# Patient Record
Sex: FEMALE | Race: BLACK OR AFRICAN AMERICAN | NOT HISPANIC OR LATINO | URBAN - METROPOLITAN AREA
[De-identification: names, ages, dates, MRNs, and addresses within clinical notes are randomized per-mention and may not be internally consistent; named-entity substitution may affect disease eponyms.]

---

## 2017-03-12 ENCOUNTER — EMERGENCY (EMERGENCY)
Facility: HOSPITAL | Age: 72
LOS: 1 days | Discharge: HOME | End: 2017-03-12
Attending: EMERGENCY MEDICINE | Admitting: EMERGENCY MEDICINE
Payer: COMMERCIAL

## 2017-03-12 VITALS
HEIGHT: 65 IN | HEART RATE: 78 BPM | OXYGEN SATURATION: 99 % | TEMPERATURE: 98 F | RESPIRATION RATE: 18 BRPM | WEIGHT: 167.99 LBS

## 2017-03-12 VITALS — RESPIRATION RATE: 16 BRPM | OXYGEN SATURATION: 100 % | HEART RATE: 90 BPM | TEMPERATURE: 98 F

## 2017-03-12 DIAGNOSIS — Z79.01 LONG TERM (CURRENT) USE OF ANTICOAGULANTS: ICD-10-CM

## 2017-03-12 DIAGNOSIS — K21.9 GASTRO-ESOPHAGEAL REFLUX DISEASE WITHOUT ESOPHAGITIS: ICD-10-CM

## 2017-03-12 DIAGNOSIS — Z87.891 PERSONAL HISTORY OF NICOTINE DEPENDENCE: ICD-10-CM

## 2017-03-12 DIAGNOSIS — I25.10 ATHEROSCLEROTIC HEART DISEASE OF NATIVE CORONARY ARTERY WITHOUT ANGINA PECTORIS: ICD-10-CM

## 2017-03-12 DIAGNOSIS — K57.32 DIVERTICULITIS OF LARGE INTESTINE WITHOUT PERFORATION OR ABSCESS WITHOUT BLEEDING: ICD-10-CM

## 2017-03-12 DIAGNOSIS — R10.32 LEFT LOWER QUADRANT PAIN: ICD-10-CM

## 2017-03-12 DIAGNOSIS — I25.2 OLD MYOCARDIAL INFARCTION: ICD-10-CM

## 2017-03-12 DIAGNOSIS — I11.0 HYPERTENSIVE HEART DISEASE WITH HEART FAILURE: ICD-10-CM

## 2017-03-12 DIAGNOSIS — I50.9 HEART FAILURE, UNSPECIFIED: ICD-10-CM

## 2017-03-12 LAB
ALBUMIN SERPL ELPH-MCNC: 4.1 G/DL — SIGNIFICANT CHANGE UP (ref 3.3–5)
ALP SERPL-CCNC: 80 U/L — SIGNIFICANT CHANGE UP (ref 40–120)
ALT FLD-CCNC: 15 U/L RC — SIGNIFICANT CHANGE UP (ref 10–45)
ANION GAP SERPL CALC-SCNC: 19 MMOL/L — HIGH (ref 5–17)
APPEARANCE UR: CLEAR — SIGNIFICANT CHANGE UP
APTT BLD: 41.1 SEC — HIGH (ref 27.5–37.4)
AST SERPL-CCNC: 21 U/L — SIGNIFICANT CHANGE UP (ref 10–40)
BASE EXCESS BLDV CALC-SCNC: 3.4 MMOL/L — HIGH (ref -2–2)
BASE EXCESS BLDV CALC-SCNC: 4.4 MMOL/L — HIGH (ref -2–2)
BASOPHILS # BLD AUTO: 0 K/UL — SIGNIFICANT CHANGE UP (ref 0–0.2)
BASOPHILS NFR BLD AUTO: 0.5 % — SIGNIFICANT CHANGE UP (ref 0–2)
BILIRUB SERPL-MCNC: 0.6 MG/DL — SIGNIFICANT CHANGE UP (ref 0.2–1.2)
BILIRUB UR-MCNC: NEGATIVE — SIGNIFICANT CHANGE UP
BUN SERPL-MCNC: 14 MG/DL — SIGNIFICANT CHANGE UP (ref 7–23)
CA-I SERPL-SCNC: 1.21 MMOL/L — SIGNIFICANT CHANGE UP (ref 1.12–1.3)
CA-I SERPL-SCNC: 1.26 MMOL/L — SIGNIFICANT CHANGE UP (ref 1.12–1.3)
CALCIUM SERPL-MCNC: 9.9 MG/DL — SIGNIFICANT CHANGE UP (ref 8.4–10.5)
CHLORIDE BLDV-SCNC: 100 MMOL/L — SIGNIFICANT CHANGE UP (ref 96–108)
CHLORIDE BLDV-SCNC: 99 MMOL/L — SIGNIFICANT CHANGE UP (ref 96–108)
CHLORIDE SERPL-SCNC: 98 MMOL/L — SIGNIFICANT CHANGE UP (ref 96–108)
CO2 BLDV-SCNC: 30 MMOL/L — SIGNIFICANT CHANGE UP (ref 22–30)
CO2 BLDV-SCNC: 34 MMOL/L — HIGH (ref 22–30)
CO2 SERPL-SCNC: 26 MMOL/L — SIGNIFICANT CHANGE UP (ref 22–31)
COLOR SPEC: SIGNIFICANT CHANGE UP
CREAT SERPL-MCNC: 0.95 MG/DL — SIGNIFICANT CHANGE UP (ref 0.5–1.3)
DIFF PNL FLD: NEGATIVE — SIGNIFICANT CHANGE UP
EOSINOPHIL # BLD AUTO: 0.1 K/UL — SIGNIFICANT CHANGE UP (ref 0–0.5)
EOSINOPHIL NFR BLD AUTO: 0.5 % — SIGNIFICANT CHANGE UP (ref 0–6)
GAS PNL BLDV: 138 MMOL/L — SIGNIFICANT CHANGE UP (ref 136–145)
GAS PNL BLDV: 141 MMOL/L — SIGNIFICANT CHANGE UP (ref 136–145)
GAS PNL BLDV: SIGNIFICANT CHANGE UP
GLUCOSE BLDV-MCNC: 107 MG/DL — HIGH (ref 70–99)
GLUCOSE BLDV-MCNC: 99 MG/DL — SIGNIFICANT CHANGE UP (ref 70–99)
GLUCOSE SERPL-MCNC: 106 MG/DL — HIGH (ref 70–99)
GLUCOSE UR QL: NEGATIVE — SIGNIFICANT CHANGE UP
HCO3 BLDV-SCNC: 29 MMOL/L — SIGNIFICANT CHANGE UP (ref 21–29)
HCO3 BLDV-SCNC: 32 MMOL/L — HIGH (ref 21–29)
HCT VFR BLD CALC: 39.8 % — SIGNIFICANT CHANGE UP (ref 34.5–45)
HCT VFR BLDA CALC: 38 % — LOW (ref 39–50)
HCT VFR BLDA CALC: 39 % — SIGNIFICANT CHANGE UP (ref 39–50)
HGB BLD CALC-MCNC: 12.3 G/DL — SIGNIFICANT CHANGE UP (ref 11.5–15.5)
HGB BLD CALC-MCNC: 12.8 G/DL — SIGNIFICANT CHANGE UP (ref 11.5–15.5)
HGB BLD-MCNC: 12.7 G/DL — SIGNIFICANT CHANGE UP (ref 11.5–15.5)
HOROWITZ INDEX BLDV+IHG-RTO: SIGNIFICANT CHANGE UP
HOROWITZ INDEX BLDV+IHG-RTO: SIGNIFICANT CHANGE UP
INR BLD: 2.31 RATIO — HIGH (ref 0.88–1.16)
KETONES UR-MCNC: NEGATIVE — SIGNIFICANT CHANGE UP
LACTATE BLDV-MCNC: 1.2 MMOL/L — SIGNIFICANT CHANGE UP (ref 0.7–2)
LACTATE BLDV-MCNC: 3.3 MMOL/L — HIGH (ref 0.7–2)
LEUKOCYTE ESTERASE UR-ACNC: NEGATIVE — SIGNIFICANT CHANGE UP
LIDOCAIN IGE QN: 26 U/L — SIGNIFICANT CHANGE UP (ref 7–60)
LYMPHOCYTES # BLD AUTO: 2.4 K/UL — SIGNIFICANT CHANGE UP (ref 1–3.3)
LYMPHOCYTES # BLD AUTO: 24.5 % — SIGNIFICANT CHANGE UP (ref 13–44)
MCHC RBC-ENTMCNC: 30 PG — SIGNIFICANT CHANGE UP (ref 27–34)
MCHC RBC-ENTMCNC: 31.9 GM/DL — LOW (ref 32–36)
MCV RBC AUTO: 93.8 FL — SIGNIFICANT CHANGE UP (ref 80–100)
MONOCYTES # BLD AUTO: 1.5 K/UL — HIGH (ref 0–0.9)
MONOCYTES NFR BLD AUTO: 14.9 % — HIGH (ref 2–14)
NEUTROPHILS # BLD AUTO: 5.9 K/UL — SIGNIFICANT CHANGE UP (ref 1.8–7.4)
NEUTROPHILS NFR BLD AUTO: 59.6 % — SIGNIFICANT CHANGE UP (ref 43–77)
NITRITE UR-MCNC: NEGATIVE — SIGNIFICANT CHANGE UP
PCO2 BLDV: 50 MMHG — SIGNIFICANT CHANGE UP (ref 35–50)
PCO2 BLDV: 68 MMHG — HIGH (ref 35–50)
PH BLDV: 7.3 — LOW (ref 7.35–7.45)
PH BLDV: 7.38 — SIGNIFICANT CHANGE UP (ref 7.35–7.45)
PH UR: 7 — SIGNIFICANT CHANGE UP (ref 4.8–8)
PLATELET # BLD AUTO: 205 K/UL — SIGNIFICANT CHANGE UP (ref 150–400)
PO2 BLDV: 20 MMHG — LOW (ref 25–45)
PO2 BLDV: 33 MMHG — SIGNIFICANT CHANGE UP (ref 25–45)
POTASSIUM BLDV-SCNC: 3.6 MMOL/L — SIGNIFICANT CHANGE UP (ref 3.5–5)
POTASSIUM BLDV-SCNC: 4 MMOL/L — SIGNIFICANT CHANGE UP (ref 3.5–5)
POTASSIUM SERPL-MCNC: 4.9 MMOL/L — SIGNIFICANT CHANGE UP (ref 3.5–5.3)
POTASSIUM SERPL-SCNC: 4.9 MMOL/L — SIGNIFICANT CHANGE UP (ref 3.5–5.3)
PROT SERPL-MCNC: 7.7 G/DL — SIGNIFICANT CHANGE UP (ref 6–8.3)
PROT UR-MCNC: NEGATIVE — SIGNIFICANT CHANGE UP
PROTHROM AB SERPL-ACNC: 25.4 SEC — HIGH (ref 10–13.1)
RBC # BLD: 4.24 M/UL — SIGNIFICANT CHANGE UP (ref 3.8–5.2)
RBC # FLD: 12.6 % — SIGNIFICANT CHANGE UP (ref 10.3–14.5)
RBC CASTS # UR COMP ASSIST: SIGNIFICANT CHANGE UP /HPF (ref 0–2)
SAO2 % BLDV: 24 % — LOW (ref 67–88)
SAO2 % BLDV: 48 % — LOW (ref 67–88)
SODIUM SERPL-SCNC: 143 MMOL/L — SIGNIFICANT CHANGE UP (ref 135–145)
SP GR SPEC: 1.01 — LOW (ref 1.01–1.02)
UROBILINOGEN FLD QL: NEGATIVE — SIGNIFICANT CHANGE UP
WBC # BLD: 9.9 K/UL — SIGNIFICANT CHANGE UP (ref 3.8–10.5)
WBC # FLD AUTO: 9.9 K/UL — SIGNIFICANT CHANGE UP (ref 3.8–10.5)
WBC UR QL: SIGNIFICANT CHANGE UP /HPF (ref 0–5)

## 2017-03-12 PROCEDURE — 87086 URINE CULTURE/COLONY COUNT: CPT

## 2017-03-12 PROCEDURE — 85730 THROMBOPLASTIN TIME PARTIAL: CPT

## 2017-03-12 PROCEDURE — 93010 ELECTROCARDIOGRAM REPORT: CPT

## 2017-03-12 PROCEDURE — 85027 COMPLETE CBC AUTOMATED: CPT

## 2017-03-12 PROCEDURE — 84132 ASSAY OF SERUM POTASSIUM: CPT

## 2017-03-12 PROCEDURE — 83615 LACTATE (LD) (LDH) ENZYME: CPT

## 2017-03-12 PROCEDURE — 81001 URINALYSIS AUTO W/SCOPE: CPT

## 2017-03-12 PROCEDURE — 99285 EMERGENCY DEPT VISIT HI MDM: CPT | Mod: 25

## 2017-03-12 PROCEDURE — 83690 ASSAY OF LIPASE: CPT

## 2017-03-12 PROCEDURE — 74177 CT ABD & PELVIS W/CONTRAST: CPT

## 2017-03-12 PROCEDURE — 83605 ASSAY OF LACTIC ACID: CPT

## 2017-03-12 PROCEDURE — 85610 PROTHROMBIN TIME: CPT

## 2017-03-12 PROCEDURE — 74177 CT ABD & PELVIS W/CONTRAST: CPT | Mod: 26

## 2017-03-12 PROCEDURE — 84295 ASSAY OF SERUM SODIUM: CPT

## 2017-03-12 PROCEDURE — 99284 EMERGENCY DEPT VISIT MOD MDM: CPT | Mod: 25

## 2017-03-12 PROCEDURE — 82330 ASSAY OF CALCIUM: CPT

## 2017-03-12 PROCEDURE — 85014 HEMATOCRIT: CPT

## 2017-03-12 PROCEDURE — 93005 ELECTROCARDIOGRAM TRACING: CPT

## 2017-03-12 PROCEDURE — 82435 ASSAY OF BLOOD CHLORIDE: CPT

## 2017-03-12 PROCEDURE — 82947 ASSAY GLUCOSE BLOOD QUANT: CPT

## 2017-03-12 PROCEDURE — 82803 BLOOD GASES ANY COMBINATION: CPT

## 2017-03-12 PROCEDURE — 80053 COMPREHEN METABOLIC PANEL: CPT

## 2017-03-12 RX ORDER — MOXIFLOXACIN HYDROCHLORIDE TABLETS, 400 MG 400 MG/1
1 TABLET, FILM COATED ORAL
Qty: 14 | Refills: 0 | OUTPATIENT
Start: 2017-03-12 | End: 2017-03-19

## 2017-03-12 RX ORDER — SODIUM CHLORIDE 9 MG/ML
1000 INJECTION INTRAMUSCULAR; INTRAVENOUS; SUBCUTANEOUS ONCE
Qty: 0 | Refills: 0 | Status: COMPLETED | OUTPATIENT
Start: 2017-03-12 | End: 2017-03-12

## 2017-03-12 RX ORDER — TAMSULOSIN HYDROCHLORIDE 0.4 MG/1
1 CAPSULE ORAL
Qty: 14 | Refills: 0 | OUTPATIENT
Start: 2017-03-12 | End: 2017-03-26

## 2017-03-12 RX ORDER — CIPROFLOXACIN LACTATE 400MG/40ML
500 VIAL (ML) INTRAVENOUS ONCE
Qty: 0 | Refills: 0 | Status: COMPLETED | OUTPATIENT
Start: 2017-03-12 | End: 2017-03-12

## 2017-03-12 RX ORDER — METRONIDAZOLE 500 MG
1 TABLET ORAL
Qty: 21 | Refills: 0 | OUTPATIENT
Start: 2017-03-12 | End: 2017-03-19

## 2017-03-12 RX ORDER — METRONIDAZOLE 500 MG
500 TABLET ORAL ONCE
Qty: 0 | Refills: 0 | Status: COMPLETED | OUTPATIENT
Start: 2017-03-12 | End: 2017-03-12

## 2017-03-12 RX ADMIN — Medication 500 MILLIGRAM(S): at 21:54

## 2017-03-12 RX ADMIN — Medication 500 MILLIGRAM(S): at 21:53

## 2017-03-12 RX ADMIN — SODIUM CHLORIDE 1000 MILLILITER(S): 9 INJECTION INTRAMUSCULAR; INTRAVENOUS; SUBCUTANEOUS at 18:28

## 2017-03-12 NOTE — ED ADULT TRIAGE NOTE - ARRIVAL INFO ADDITIONAL COMMENTS
pt had LVAD placed 2 years ago at [facility].  Pt c/o site of insertion pain. pt had LVAD placed 2 years ago at [facility].  Pt c/o site of insertion pain.  LVAD  Nelsy Davidson called and told pt is in ED

## 2017-03-12 NOTE — ED PROVIDER NOTE - PHYSICAL EXAMINATION
PE: CONSTITUTIONAL: Well appearing, well nourished, in no apparent distress. ENMT: Airway patent, nasal mucosa clear, mouth with normal mucosa. HEAD: NCAT EYES: PERRL, EOMI CARDIAC: pulseless, constant hum, +1 pedal edema RESPIRATORY: CTA b/l, no adventitious sounds GI: Abdomen non-distended, LLQ moderately TTP, no rebound or guarding MSK: Spine appears normal, range of motion is not limited, no muscle/joint tenderness NEURO: CNII-XII grossly intact, 5/5 strength, full sensation all extremities, gait intact SKIN: No rash

## 2017-03-12 NOTE — ED ADULT NURSE NOTE - CHPI ED SYMPTOMS NEG
no blood in stool/no nausea/no hematuria/no diarrhea/no burning urination/no vomiting/no fever/no chills/no dysuria

## 2017-03-12 NOTE — ED PROVIDER NOTE - PLAN OF CARE
1. Take the prescribed antibiotics as directed for the full course. Ciprofloxacin 500mg every 12 hours for 7 days, and Flagyl 500mg every 8 hours for 7 days. Continue all other home medications.   2. Follow up with your Primary Care Physician as soon as possible for further evaluation.   3. Return to the Emergency Department for any concerning symptoms.

## 2017-03-12 NOTE — ED PROVIDER NOTE - PROGRESS NOTE DETAILS
LVAD coordinator notified, will f/up Recived sign out Dr Lester 71y female [facility] coronary artery disease., sp mi AND lvad followed at [facility], chf, gerd,fibromyalgia pw ca abd pain llq, Ct fluid bolus and repeat lactate pending. PE adult female awake alert chest clear abd min llq ttp no rebound guarding. +bs neuro no focal defects  Joaquin Green MD, Facep b LVAD contacted for battery  Joaquin Green MD, Facep Spoke with LVAD coordinator, she reviewed labs but requests an LDH be sent off and requests a page before any potential discharge of the patient, [phone]. Gordon Castellanos PA-C . Spoke with LVAD coordinator, she reviewed labs but requests an LDH be sent off and requests a page before any potential discharge of the patient, [phone]. LVAD was interrogated with no alarms or issues. Gordon Castellanos PA-C . Had prolonged dw pt concerning dx of diverticulitis, Pt advised admit/transfer given complexity of her conditon. Pt competent to refuse. Understands risks inculding worsening infection. Prefers to dc home.  Joaquin Green MD, Facep

## 2017-03-12 NOTE — ED PROVIDER NOTE - CARE PLAN
Principal Discharge DX:	Diverticulosis of large intestine without hemorrhage Principal Discharge DX:	Diverticulosis of large intestine without hemorrhage  Instructions for follow-up, activity and diet:	1. Take the prescribed antibiotics as directed for the full course. Ciprofloxacin 500mg every 12 hours for 7 days, and Flagyl 500mg every 8 hours for 7 days. Continue all other home medications.   2. Follow up with your Primary Care Physician as soon as possible for further evaluation.   3. Return to the Emergency Department for any concerning symptoms.

## 2017-03-12 NOTE — ED ADULT NURSE NOTE - OBJECTIVE STATEMENT
71 yr old female with a h/o LVAD present to the ER for pain to the lower abd region. Pt reports that she thinks she has a UTI , however denies urinary discomfort. Pt reports a pain level of 3/10 on pain scale and reports that pain gets worse intermittently. Pt states pain is on and off. Pt denies nausea, vomiting/ diarrhea/ constipation.

## 2017-03-12 NOTE — ED PROVIDER NOTE - ATTENDING CONTRIBUTION TO CARE
71f pmhx CAD s/p MI, CHF s/p LVAD ([facility] last year), HTN, GERD, fibromyalgia p/w abdominal pain. gradual onset, lower abdomen, LLQ worse than RLQ, constant, non-radiating, moderate. denies fever/chills, nausea/vomiting, constipation/diarrhea, dysuria/hematuria. still passing gas. on PE, pulseless 2/2 LVAD, no distress, abdomen non-distended, LLQ moderately TTP, no rebound or guarding. will CT a/p, basics, lipase, VBG, contact LVAD coordinator

## 2017-03-12 NOTE — ED PROVIDER NOTE - NS ED ROS FT
ROS: GENERAL: no fevers, no chills HEENT: no epistaxis, no eye pain, no ear, no throat pain CARDIAC: no chest pain, no shortness of breath PULM: no cough, no shortness of breath GI: no nausea, no vomiting, no diarrhea,  +abdominal pain, no hematemesis, no bright red blood per rectum : no dysuria, no hematuria EXTREMITIES: no arm pain, no leg pain, no back pain SKIN: no purpura, no petechiae NEURO: no headache, no neck pain, no loss of strength/sensation HEME: no easy bruising, no easy bleeding

## 2017-03-12 NOTE — ED PROVIDER NOTE - MEDICAL DECISION MAKING DETAILS
71f pmhx CAD s/p MI, CHF s/p LVAD ([facility] last year), HTN, GERD, fibromyalgia p/w abdominal pain. gradual onset, lower abdomen, LLQ worse than RLQ, constant, non-radiating, moderate. denies fever/chills, nausea/vomiting, constipation/diarrhea, dysuria/hematuria. still passing gas. on PE, pulseless 2/2 LVAD, no distress, abdomen non-distended, LLQ moderately TTP, no rebound or guarding. will CT a/p, basics, lipase, VBG, contact LVAD coordinator

## 2017-03-12 NOTE — ED ADULT NURSE REASSESSMENT NOTE - NS ED NURSE REASSESS COMMENT FT1
LVAD coordinator called Nelsy Arguelles at [phone] aware of PT
LVAD TEAM AT bedside.
as per pt " LVAD battery is going to die in 1hour" pt did not bring spare battery LVAD coordinator Nelsy Davidson called at 1930. at bedside at 1938. as per pt son at bedside he is unable to go home to retrieve battery as "it is inconvenient for him"  pt son aware of importance of retrieving pt own battery and risk to not

## 2017-03-12 NOTE — ED ADULT NURSE NOTE - PMH
Acid reflux    CHF (congestive heart failure)    Fibromyalgia    GERD (gastroesophageal reflux disease)    Myocardial infarction    Polymyalgia rheumatica

## 2017-03-12 NOTE — ED PROVIDER NOTE - OBJECTIVE STATEMENT
71f pmhx CAD s/p MI, CHF s/p LVAD ([facility] last year), HTN, GERD, fibromyalgia p/w abdominal pain. gradual onset, lower abdomen, LLQ worse than RLQ, constant, non-radiating, moderate. denies fever/chills, nausea/vomiting, constipation/diarrhea, dysuria/hematuria. still passing gas.

## 2017-03-13 LAB
CULTURE RESULTS: NO GROWTH — SIGNIFICANT CHANGE UP
SPECIMEN SOURCE: SIGNIFICANT CHANGE UP

## 2017-06-09 ENCOUNTER — INPATIENT (INPATIENT)
Facility: HOSPITAL | Age: 72
LOS: 4 days | Discharge: HOME | DRG: 392 | End: 2017-06-14
Attending: THORACIC SURGERY (CARDIOTHORACIC VASCULAR SURGERY) | Admitting: SURGERY
Payer: COMMERCIAL

## 2017-06-09 VITALS
WEIGHT: 173.06 LBS | HEART RATE: 80 BPM | OXYGEN SATURATION: 98 % | HEIGHT: 66 IN | RESPIRATION RATE: 18 BRPM | TEMPERATURE: 98 F

## 2017-06-09 DIAGNOSIS — Z95.811 PRESENCE OF HEART ASSIST DEVICE: Chronic | ICD-10-CM

## 2017-06-09 DIAGNOSIS — K57.32 DIVERTICULITIS OF LARGE INTESTINE WITHOUT PERFORATION OR ABSCESS WITHOUT BLEEDING: ICD-10-CM

## 2017-06-09 DIAGNOSIS — K57.92 DIVERTICULITIS OF INTESTINE, PART UNSPECIFIED, WITHOUT PERFORATION OR ABSCESS WITHOUT BLEEDING: ICD-10-CM

## 2017-06-09 LAB
ALBUMIN SERPL ELPH-MCNC: 4 G/DL — SIGNIFICANT CHANGE UP (ref 3.3–5)
ALP SERPL-CCNC: 75 U/L — SIGNIFICANT CHANGE UP (ref 40–120)
ALT FLD-CCNC: 11 U/L RC — SIGNIFICANT CHANGE UP (ref 10–45)
ANION GAP SERPL CALC-SCNC: 17 MMOL/L — SIGNIFICANT CHANGE UP (ref 5–17)
APPEARANCE UR: ABNORMAL
APTT BLD: 39.9 SEC — HIGH (ref 27.5–37.4)
AST SERPL-CCNC: 18 U/L — SIGNIFICANT CHANGE UP (ref 10–40)
BACTERIA # UR AUTO: ABNORMAL /HPF
BASE EXCESS BLDV CALC-SCNC: 3.4 MMOL/L — HIGH (ref -2–2)
BASOPHILS # BLD AUTO: 0 K/UL — SIGNIFICANT CHANGE UP (ref 0–0.2)
BASOPHILS NFR BLD AUTO: 0.2 % — SIGNIFICANT CHANGE UP (ref 0–2)
BILIRUB SERPL-MCNC: 0.6 MG/DL — SIGNIFICANT CHANGE UP (ref 0.2–1.2)
BILIRUB UR-MCNC: NEGATIVE — SIGNIFICANT CHANGE UP
BLD GP AB SCN SERPL QL: NEGATIVE — SIGNIFICANT CHANGE UP
BUN SERPL-MCNC: 13 MG/DL — SIGNIFICANT CHANGE UP (ref 7–23)
CA-I SERPL-SCNC: 1.21 MMOL/L — SIGNIFICANT CHANGE UP (ref 1.12–1.3)
CALCIUM SERPL-MCNC: 9.6 MG/DL — SIGNIFICANT CHANGE UP (ref 8.4–10.5)
CHLORIDE BLDV-SCNC: 98 MMOL/L — SIGNIFICANT CHANGE UP (ref 96–108)
CHLORIDE SERPL-SCNC: 96 MMOL/L — SIGNIFICANT CHANGE UP (ref 96–108)
CO2 BLDV-SCNC: 30 MMOL/L — SIGNIFICANT CHANGE UP (ref 22–30)
CO2 SERPL-SCNC: 25 MMOL/L — SIGNIFICANT CHANGE UP (ref 22–31)
COLOR SPEC: YELLOW — SIGNIFICANT CHANGE UP
CREAT SERPL-MCNC: 1.05 MG/DL — SIGNIFICANT CHANGE UP (ref 0.5–1.3)
DIFF PNL FLD: NEGATIVE — SIGNIFICANT CHANGE UP
EOSINOPHIL # BLD AUTO: 0 K/UL — SIGNIFICANT CHANGE UP (ref 0–0.5)
EOSINOPHIL NFR BLD AUTO: 0.4 % — SIGNIFICANT CHANGE UP (ref 0–6)
EPI CELLS # UR: SIGNIFICANT CHANGE UP /HPF
GAS PNL BLDV: 136 MMOL/L — SIGNIFICANT CHANGE UP (ref 136–145)
GAS PNL BLDV: SIGNIFICANT CHANGE UP
GAS PNL BLDV: SIGNIFICANT CHANGE UP
GLUCOSE BLDV-MCNC: 99 MG/DL — SIGNIFICANT CHANGE UP (ref 70–99)
GLUCOSE SERPL-MCNC: 101 MG/DL — HIGH (ref 70–99)
GLUCOSE UR QL: NEGATIVE — SIGNIFICANT CHANGE UP
HCO3 BLDV-SCNC: 29 MMOL/L — SIGNIFICANT CHANGE UP (ref 21–29)
HCT VFR BLD CALC: 37.9 % — SIGNIFICANT CHANGE UP (ref 34.5–45)
HCT VFR BLDA CALC: 41 % — SIGNIFICANT CHANGE UP (ref 39–50)
HGB BLD CALC-MCNC: 13.4 G/DL — SIGNIFICANT CHANGE UP (ref 11.5–15.5)
HGB BLD-MCNC: 12.7 G/DL — SIGNIFICANT CHANGE UP (ref 11.5–15.5)
HOROWITZ INDEX BLDV+IHG-RTO: SIGNIFICANT CHANGE UP
INR BLD: 2.49 RATIO — HIGH (ref 0.88–1.16)
KETONES UR-MCNC: NEGATIVE — SIGNIFICANT CHANGE UP
LACTATE BLDV-MCNC: 2.9 MMOL/L — HIGH (ref 0.7–2)
LEUKOCYTE ESTERASE UR-ACNC: ABNORMAL
LYMPHOCYTES # BLD AUTO: 1.6 K/UL — SIGNIFICANT CHANGE UP (ref 1–3.3)
LYMPHOCYTES # BLD AUTO: 16.9 % — SIGNIFICANT CHANGE UP (ref 13–44)
MCHC RBC-ENTMCNC: 31.4 PG — SIGNIFICANT CHANGE UP (ref 27–34)
MCHC RBC-ENTMCNC: 33.4 GM/DL — SIGNIFICANT CHANGE UP (ref 32–36)
MCV RBC AUTO: 94 FL — SIGNIFICANT CHANGE UP (ref 80–100)
MONOCYTES # BLD AUTO: 1.2 K/UL — HIGH (ref 0–0.9)
MONOCYTES NFR BLD AUTO: 13.3 % — SIGNIFICANT CHANGE UP (ref 2–14)
NEUTROPHILS # BLD AUTO: 6.5 K/UL — SIGNIFICANT CHANGE UP (ref 1.8–7.4)
NEUTROPHILS NFR BLD AUTO: 69.2 % — SIGNIFICANT CHANGE UP (ref 43–77)
NITRITE UR-MCNC: NEGATIVE — SIGNIFICANT CHANGE UP
PCO2 BLDV: 50 MMHG — SIGNIFICANT CHANGE UP (ref 35–50)
PH BLDV: 7.38 — SIGNIFICANT CHANGE UP (ref 7.35–7.45)
PH UR: 6.5 — SIGNIFICANT CHANGE UP (ref 5–8)
PLATELET # BLD AUTO: 179 K/UL — SIGNIFICANT CHANGE UP (ref 150–400)
PO2 BLDV: 28 MMHG — SIGNIFICANT CHANGE UP (ref 25–45)
POTASSIUM BLDV-SCNC: 3.3 MMOL/L — LOW (ref 3.5–5)
POTASSIUM SERPL-MCNC: 3.3 MMOL/L — LOW (ref 3.5–5.3)
POTASSIUM SERPL-SCNC: 3.3 MMOL/L — LOW (ref 3.5–5.3)
PROT SERPL-MCNC: 8.1 G/DL — SIGNIFICANT CHANGE UP (ref 6–8.3)
PROT UR-MCNC: SIGNIFICANT CHANGE UP
PROTHROM AB SERPL-ACNC: 27.4 SEC — HIGH (ref 9.8–12.7)
RBC # BLD: 4.04 M/UL — SIGNIFICANT CHANGE UP (ref 3.8–5.2)
RBC # FLD: 12.3 % — SIGNIFICANT CHANGE UP (ref 10.3–14.5)
RBC CASTS # UR COMP ASSIST: SIGNIFICANT CHANGE UP /HPF (ref 0–2)
RH IG SCN BLD-IMP: POSITIVE — SIGNIFICANT CHANGE UP
SAO2 % BLDV: 45 % — LOW (ref 67–88)
SODIUM SERPL-SCNC: 138 MMOL/L — SIGNIFICANT CHANGE UP (ref 135–145)
SP GR SPEC: 1.01 — LOW (ref 1.01–1.02)
UROBILINOGEN FLD QL: NEGATIVE — SIGNIFICANT CHANGE UP
WBC # BLD: 9.4 K/UL — SIGNIFICANT CHANGE UP (ref 3.8–10.5)
WBC # FLD AUTO: 9.4 K/UL — SIGNIFICANT CHANGE UP (ref 3.8–10.5)
WBC UR QL: SIGNIFICANT CHANGE UP /HPF (ref 0–5)

## 2017-06-09 PROCEDURE — 99221 1ST HOSP IP/OBS SF/LOW 40: CPT

## 2017-06-09 PROCEDURE — 99285 EMERGENCY DEPT VISIT HI MDM: CPT

## 2017-06-09 PROCEDURE — 74176 CT ABD & PELVIS W/O CONTRAST: CPT | Mod: 26

## 2017-06-09 RX ORDER — DOCUSATE SODIUM 100 MG
100 CAPSULE ORAL THREE TIMES A DAY
Qty: 0 | Refills: 0 | Status: DISCONTINUED | OUTPATIENT
Start: 2017-06-09 | End: 2017-06-11

## 2017-06-09 RX ORDER — POTASSIUM CHLORIDE 20 MEQ
20 PACKET (EA) ORAL ONCE
Qty: 0 | Refills: 0 | Status: COMPLETED | OUTPATIENT
Start: 2017-06-09 | End: 2017-06-09

## 2017-06-09 RX ORDER — DIGOXIN 250 MCG
0.12 TABLET ORAL DAILY
Qty: 0 | Refills: 0 | Status: DISCONTINUED | OUTPATIENT
Start: 2017-06-09 | End: 2017-06-14

## 2017-06-09 RX ORDER — CIPROFLOXACIN LACTATE 400MG/40ML
400 VIAL (ML) INTRAVENOUS ONCE
Qty: 0 | Refills: 0 | Status: COMPLETED | OUTPATIENT
Start: 2017-06-09 | End: 2017-06-09

## 2017-06-09 RX ORDER — METOPROLOL TARTRATE 50 MG
25 TABLET ORAL DAILY
Qty: 0 | Refills: 0 | Status: DISCONTINUED | OUTPATIENT
Start: 2017-06-09 | End: 2017-06-14

## 2017-06-09 RX ORDER — LOSARTAN POTASSIUM 100 MG/1
50 TABLET, FILM COATED ORAL DAILY
Qty: 0 | Refills: 0 | Status: DISCONTINUED | OUTPATIENT
Start: 2017-06-09 | End: 2017-06-14

## 2017-06-09 RX ORDER — METRONIDAZOLE 500 MG
500 TABLET ORAL EVERY 8 HOURS
Qty: 0 | Refills: 0 | Status: DISCONTINUED | OUTPATIENT
Start: 2017-06-09 | End: 2017-06-10

## 2017-06-09 RX ORDER — ATORVASTATIN CALCIUM 80 MG/1
80 TABLET, FILM COATED ORAL AT BEDTIME
Qty: 0 | Refills: 0 | Status: DISCONTINUED | OUTPATIENT
Start: 2017-06-09 | End: 2017-06-14

## 2017-06-09 RX ORDER — ASPIRIN/CALCIUM CARB/MAGNESIUM 324 MG
81 TABLET ORAL DAILY
Qty: 0 | Refills: 0 | Status: DISCONTINUED | OUTPATIENT
Start: 2017-06-09 | End: 2017-06-14

## 2017-06-09 RX ORDER — WARFARIN SODIUM 2.5 MG/1
2 TABLET ORAL ONCE
Qty: 0 | Refills: 0 | Status: COMPLETED | OUTPATIENT
Start: 2017-06-09 | End: 2017-06-09

## 2017-06-09 RX ORDER — CIPROFLOXACIN LACTATE 400MG/40ML
400 VIAL (ML) INTRAVENOUS EVERY 12 HOURS
Qty: 0 | Refills: 0 | Status: DISCONTINUED | OUTPATIENT
Start: 2017-06-09 | End: 2017-06-10

## 2017-06-09 RX ORDER — METRONIDAZOLE 500 MG
500 TABLET ORAL ONCE
Qty: 0 | Refills: 0 | Status: COMPLETED | OUTPATIENT
Start: 2017-06-09 | End: 2017-06-09

## 2017-06-09 RX ADMIN — ATORVASTATIN CALCIUM 80 MILLIGRAM(S): 80 TABLET, FILM COATED ORAL at 23:41

## 2017-06-09 RX ADMIN — Medication 25 MILLIGRAM(S): at 23:40

## 2017-06-09 RX ADMIN — WARFARIN SODIUM 2 MILLIGRAM(S): 2.5 TABLET ORAL at 23:41

## 2017-06-09 RX ADMIN — Medication 100 MILLIGRAM(S): at 23:40

## 2017-06-09 RX ADMIN — Medication 20 MILLIEQUIVALENT(S): at 23:47

## 2017-06-09 RX ADMIN — Medication 200 MILLIGRAM(S): at 16:22

## 2017-06-09 RX ADMIN — Medication 100 MILLIGRAM(S): at 15:30

## 2017-06-09 RX ADMIN — Medication 100 MILLIGRAM(S): at 23:39

## 2017-06-09 NOTE — H&P ADULT - NSHPPHYSICALEXAM_GEN_ALL_CORE
A&O x 3  S1S2  CTA b/l  Abd soft, mild tenderness to LLQ, +BS,+BM, voiding  Ext neg c/c/e Vital Signs  T(C): 37.8, Max: 37.8 (06-09 @ 18:24)  HR: 87 (80 - 87)  BP: 102/76 (89/37 - 102/76)  RR: 16 (16 - 18)  SpO2: 97% (97% - 100%)    Physical exam:   A&O x 3  S1S2  CTA b/l  Abd soft, mild tenderness to LLQ, +BS,+BM, voiding  Ext neg c/c/e

## 2017-06-09 NOTE — H&P ADULT - PMH
Acid reflux    CHF (congestive heart failure)    Diverticulitis    Fibromyalgia    GERD (gastroesophageal reflux disease)    Myocardial infarction    Polymyalgia rheumatica

## 2017-06-09 NOTE — ED PROVIDER NOTE - MEDICAL DECISION MAKING DETAILS
Patient with hx of diverticulitis now presenting with recurrent pain in the LLQ. Feels like diverticulitis. Also has bad CHF that is compensative now.   Will get labs, cat scan, and reevaluate. Patient with hx of diverticulitis now presenting with recurrent pain in the LLQ. Feels like diverticulitis. Also has bad CHF that is compensative now. Will get labs, cat scan, and reevaluate.

## 2017-06-09 NOTE — ED ADULT NURSE NOTE - OBJECTIVE STATEMENT
71 year old female, a+ox3, presented ambulatory to ED complaining of lower abd pain/pressure x3days. Pt states the pain is intermittent and reports that she has had a hx of diverticulitis and a CT scan of the abd 2 months ago. abd soft nontender, nondistended. pain to light palpation. denies fevers, chills, nausea/vomiting, diarrhea. Reports decreased appetite, however, she is hydrating herself with water. Lungs CTA unlabored. Pt presents with an LVAD.  paged team to notify the team that the pt is in the ER. Pt denies chest pain, shortness of breath or distal edema. Skin w/d/i.

## 2017-06-09 NOTE — CONSULT NOTE ADULT - ASSESSMENT
This is a 71 year old female with hx of LVAD in 2015 admitted w/ Veda II diverticulitis   - IR consulted for possible drainage (Dr Arrieta) -> collection not amenable for drainage.   - IV Abx (Cipro/Flagyl)   - Admitted to CTS for LVAD management.   - Discussed w/ CTS  - Cardiology consult placed for heart failure service follow up.   - Seen and examined w/ Dr Velez This is a 71 year old female with hx of LVAD in 2015 admitted w/ Veda II diverticulitis (~3cm cesar-colonic abscess)   - IR consulted for possible drainage (Dr Arrieta) -> collection not amenable for drainage.   - IV Abx (Cipro/Flagyl)   - Admitted to CTS for LVAD management.   - Discussed w/ CTS  - Cardiology consult placed for heart failure service follow up.   - Seen and examined w/ Dr Velez This is a 71 year old female with hx of LVAD (2015) on coumadin admitted w/ Veda II diverticulitis (~3cm cesar-colonic abscess)   - IR consulted for possible drainage (Dr Arrieta) -> collection not amenable for drainage.   - IV Abx (Cipro/Flagyl)   - Admitted to CTS for LVAD management.   - Discussed w/ CTS  - Cardiology consult placed for heart failure service follow up.   - Seen and examined w/ Dr Velez

## 2017-06-09 NOTE — ED ADULT NURSE REASSESSMENT NOTE - NS ED NURSE REASSESS COMMENT FT1
Pt to be admitted to [facility] secondary to LVAD. Chuckie Yeung contacted Nursing education, and advised to not take blood pressures. Vitals excluding BP readings. To be admitted to Dr. Campo. Unit rep aware.

## 2017-06-09 NOTE — ED PROVIDER NOTE - NS ED MD SCRIBE ATTENDING SCRIBE SECTIONS
HISTORY OF PRESENT ILLNESS/PROGRESS NOTE/DISPOSITION/PHYSICAL EXAM/RESULTS/VITAL SIGNS( Pullset)/HIV/REVIEW OF SYSTEMS/PAST MEDICAL/SURGICAL/SOCIAL HISTORY

## 2017-06-09 NOTE — H&P ADULT - PROBLEM SELECTOR PLAN 1
Admit to telemetry  General Surgery consult  Cipro 400 IV BID and Flagyl 500 IV BID  Anticoagulation for LVAD  c/w asa, bblocker, statin, lasix

## 2017-06-09 NOTE — ED ADULT NURSE NOTE - PSH
No significant past surgical history    No significant past surgical history LVAD (left ventricular assist device) present    No significant past surgical history

## 2017-06-09 NOTE — CONSULT NOTE ADULT - ATTENDING COMMENTS
71F with LVAD on Coumadin presents with acute diverticulitis and 3cm cesar-colonic abscess. had a first episode of acute diverticulitis a few months ago that resolved with ABx. Patient reports a normal colonoscopy in 1/2017.  -consult IR for percutaneous drainage  -consult heart failure team for LVAD management

## 2017-06-09 NOTE — ED ADULT NURSE REASSESSMENT NOTE - NS ED NURSE REASSESS COMMENT FT1
transport here for transport to CT. 20g in AC being attempted secondary to difficult IV access in arms. Will transport patient to CT when completed

## 2017-06-09 NOTE — ED PROVIDER NOTE - GASTROINTESTINAL, MLM
Abdomen soft with some discomfort to the LLQ on palpation. Abdomen soft with some discomfort to the LLQ on palpation. No masses.

## 2017-06-09 NOTE — ED PROVIDER NOTE - OBJECTIVE STATEMENT
71 year old female patient with pmhx of diverticulitis, CHF s/p LVAD presents to the ED c/o intermittent cramping lower abdominal pain x3 days. Is not currently in any pain in the ED. Also notes decreased appetite and decreased PO intake. She believes it is diverticulitis again. Denies increased swelling to her lower extremities.   Denies fever, nausea, chest pain, dysuria, hematuria. 71 year old female patient with pmhx of diverticulitis, CHF s/p LVAD presents to the ED c/o intermittent cramping lower abdominal pain x3 days. Is not currently in any pain in the ED. Also notes decreased appetite and decreased PO intake. She believes it is diverticulitis again. Denies increased swelling to her lower extremities.   Denies fever, nausea, chest pain, dysuria, hematuria.  PMD: Dr. Polo Olson

## 2017-06-09 NOTE — ED ADULT NURSE REASSESSMENT NOTE - NS ED NURSE REASSESS COMMENT FT1
type sent as per order. surgical consult completed . pt to be admitted to surgery for abscess . will cont to monitor.

## 2017-06-09 NOTE — H&P ADULT - ASSESSMENT
70 y/o female h/o LVAD admitted for diverticulitis This is a 71 year old female with hx of LVAD in 2015 admitted w/ Veda II diverticulitis   - IR consulted for possible drainage (Dr Arrieta) -> collection not amenable for drainage.   - IV Abx (Cipro/Flagyl)   - Admitted to CTS for LVAD management.   - Discussed w/ CTS  - Cardiology consult placed for heart failure service follow up.   - Seen and examined w/ Dr Velez

## 2017-06-09 NOTE — H&P ADULT - NSHPLABSRESULTS_GEN_ALL_CORE
Labs ( 09 Jun 2017):                        12.7   9.4   )-----------( 179                   37.9     138  |  96  |  13  ----------------------------<  101  3.3  |  25  |  1.05    PT: 27.4     INR: 2.49  PTT:39.9     UA - equivocal, moderate leukocyte esterase, negative nitrite    Imaging:     CT Abdomen / Pelvis:   Sigmoid diverticulitis w/ ~3.4cm abscess

## 2017-06-09 NOTE — H&P ADULT - HISTORY OF PRESENT ILLNESS
72 y/o female h/o LVAD 2015 presents to ED c/o LLQ pain, nausea and decreased appetite x 3days. Denies fever or blood in stool. 72 y/o female h/o LVAD implanted 2015 @ [facility] presents to ED c/o LLQ pain, nausea and decreased appetite x 3days. Denies fever or blood in stool. 70 y/o female h/o LVAD implanted 2015 @ [facility] presents to ED c/o LLQ pain, nausea and decreased appetite x 3days. No fevers, no chills, no diarrhea, no constipation, no change in bowel habits.   Her last colonoscopy was reportedly normal in 1/2017.

## 2017-06-09 NOTE — ED ADULT NURSE REASSESSMENT NOTE - NS ED NURSE REASSESS COMMENT FT1
LVAD team paged multiple times by unit rep. No returned phone call. Cell called as well. no answer. Will continue to attempt to page.

## 2017-06-09 NOTE — ED ADULT NURSE REASSESSMENT NOTE - NS ED NURSE REASSESS COMMENT FT1
spoke with Nelsy coordinator for LVAD. after discussion with [facility], there are no available beds for admission in [facility] . Pt will be remain here. communication between Dr. Mathew and Nelsy in progress. Pt has an extra monitor and batteries at bedside in no acute distress.

## 2017-06-09 NOTE — ED PROVIDER NOTE - PMH
Acid reflux    CHF (congestive heart failure)    Fibromyalgia    GERD (gastroesophageal reflux disease)    Myocardial infarction    Polymyalgia rheumatica Acid reflux    CHF (congestive heart failure)    Diverticulitis    Fibromyalgia    GERD (gastroesophageal reflux disease)    Myocardial infarction    Polymyalgia rheumatica

## 2017-06-10 LAB
ANION GAP SERPL CALC-SCNC: 15 MMOL/L — SIGNIFICANT CHANGE UP (ref 5–17)
APTT BLD: 39.7 SEC — HIGH (ref 27.5–37.4)
BUN SERPL-MCNC: 13 MG/DL — SIGNIFICANT CHANGE UP (ref 7–23)
CALCIUM SERPL-MCNC: 9.4 MG/DL — SIGNIFICANT CHANGE UP (ref 8.4–10.5)
CHLORIDE SERPL-SCNC: 98 MMOL/L — SIGNIFICANT CHANGE UP (ref 96–108)
CO2 SERPL-SCNC: 21 MMOL/L — LOW (ref 22–31)
CREAT SERPL-MCNC: 1.09 MG/DL — SIGNIFICANT CHANGE UP (ref 0.5–1.3)
GLUCOSE SERPL-MCNC: 120 MG/DL — HIGH (ref 70–99)
HCT VFR BLD CALC: 36.9 % — SIGNIFICANT CHANGE UP (ref 34.5–45)
HGB BLD-MCNC: 11.7 G/DL — SIGNIFICANT CHANGE UP (ref 11.5–15.5)
INR BLD: 2.42 RATIO — HIGH (ref 0.88–1.16)
MAGNESIUM SERPL-MCNC: 2.1 MG/DL — SIGNIFICANT CHANGE UP (ref 1.6–2.6)
MCHC RBC-ENTMCNC: 29.4 PG — SIGNIFICANT CHANGE UP (ref 27–34)
MCHC RBC-ENTMCNC: 31.7 GM/DL — LOW (ref 32–36)
MCV RBC AUTO: 92.7 FL — SIGNIFICANT CHANGE UP (ref 80–100)
PHOSPHATE SERPL-MCNC: 3.2 MG/DL — SIGNIFICANT CHANGE UP (ref 2.5–4.5)
PLATELET # BLD AUTO: 232 K/UL — SIGNIFICANT CHANGE UP (ref 150–400)
POTASSIUM SERPL-MCNC: 4.8 MMOL/L — SIGNIFICANT CHANGE UP (ref 3.5–5.3)
POTASSIUM SERPL-SCNC: 4.8 MMOL/L — SIGNIFICANT CHANGE UP (ref 3.5–5.3)
PROTHROM AB SERPL-ACNC: 27.9 SEC — HIGH (ref 10–13.1)
RBC # BLD: 3.98 M/UL — SIGNIFICANT CHANGE UP (ref 3.8–5.2)
RBC # FLD: 13.6 % — SIGNIFICANT CHANGE UP (ref 10.3–14.5)
SODIUM SERPL-SCNC: 134 MMOL/L — LOW (ref 135–145)
WBC # BLD: 10.72 K/UL — HIGH (ref 3.8–10.5)
WBC # FLD AUTO: 10.72 K/UL — HIGH (ref 3.8–10.5)

## 2017-06-10 PROCEDURE — 99232 SBSQ HOSP IP/OBS MODERATE 35: CPT

## 2017-06-10 RX ORDER — PIPERACILLIN AND TAZOBACTAM 4; .5 G/20ML; G/20ML
3.38 INJECTION, POWDER, LYOPHILIZED, FOR SOLUTION INTRAVENOUS ONCE
Qty: 0 | Refills: 0 | Status: COMPLETED | OUTPATIENT
Start: 2017-06-10 | End: 2017-06-10

## 2017-06-10 RX ORDER — ONDANSETRON 8 MG/1
4 TABLET, FILM COATED ORAL ONCE
Qty: 0 | Refills: 0 | Status: COMPLETED | OUTPATIENT
Start: 2017-06-10 | End: 2017-06-10

## 2017-06-10 RX ORDER — PANTOPRAZOLE SODIUM 20 MG/1
40 TABLET, DELAYED RELEASE ORAL
Qty: 0 | Refills: 0 | Status: DISCONTINUED | OUTPATIENT
Start: 2017-06-10 | End: 2017-06-14

## 2017-06-10 RX ORDER — WARFARIN SODIUM 2.5 MG/1
2.5 TABLET ORAL ONCE
Qty: 0 | Refills: 0 | Status: COMPLETED | OUTPATIENT
Start: 2017-06-10 | End: 2017-06-10

## 2017-06-10 RX ORDER — PIPERACILLIN AND TAZOBACTAM 4; .5 G/20ML; G/20ML
3.38 INJECTION, POWDER, LYOPHILIZED, FOR SOLUTION INTRAVENOUS EVERY 8 HOURS
Qty: 0 | Refills: 0 | Status: DISCONTINUED | OUTPATIENT
Start: 2017-06-10 | End: 2017-06-12

## 2017-06-10 RX ADMIN — Medication 25 MILLIGRAM(S): at 06:00

## 2017-06-10 RX ADMIN — Medication 100 MILLIGRAM(S): at 05:59

## 2017-06-10 RX ADMIN — PANTOPRAZOLE SODIUM 40 MILLIGRAM(S): 20 TABLET, DELAYED RELEASE ORAL at 12:53

## 2017-06-10 RX ADMIN — WARFARIN SODIUM 2.5 MILLIGRAM(S): 2.5 TABLET ORAL at 21:46

## 2017-06-10 RX ADMIN — Medication 200 MILLIGRAM(S): at 06:00

## 2017-06-10 RX ADMIN — ATORVASTATIN CALCIUM 80 MILLIGRAM(S): 80 TABLET, FILM COATED ORAL at 21:46

## 2017-06-10 RX ADMIN — PIPERACILLIN AND TAZOBACTAM 25 GRAM(S): 4; .5 INJECTION, POWDER, LYOPHILIZED, FOR SOLUTION INTRAVENOUS at 21:46

## 2017-06-10 RX ADMIN — LOSARTAN POTASSIUM 50 MILLIGRAM(S): 100 TABLET, FILM COATED ORAL at 06:00

## 2017-06-10 RX ADMIN — Medication 100 MILLIGRAM(S): at 06:00

## 2017-06-10 RX ADMIN — PIPERACILLIN AND TAZOBACTAM 200 GRAM(S): 4; .5 INJECTION, POWDER, LYOPHILIZED, FOR SOLUTION INTRAVENOUS at 17:16

## 2017-06-10 RX ADMIN — Medication 100 MILLIGRAM(S): at 14:12

## 2017-06-10 RX ADMIN — ONDANSETRON 4 MILLIGRAM(S): 8 TABLET, FILM COATED ORAL at 03:39

## 2017-06-10 RX ADMIN — Medication 0.12 MILLIGRAM(S): at 06:00

## 2017-06-10 RX ADMIN — Medication 81 MILLIGRAM(S): at 12:53

## 2017-06-10 NOTE — PROGRESS NOTE ADULT - SUBJECTIVE AND OBJECTIVE BOX
GENERAL SURGERY DAILY PROGRESS NOTE:       Subjective:  mild RLQ pain, denies n/v/f/c, denies SOB or CP     Objective:  General: NAD  HEENT: WNL  Lymph nodes: Non-tender, no palpable masses  Neck: No masses  Cardiovascular: Regular rate and rhythm; normal S1, S2; no murmurs, rubs, or gallops  Lungs: Lungs clear to auscultation bilaterally; No wheezes or crackles    Abdominal:  -Abdomen soft and non-distended  -No pulsatile masses, no abdominal bruits ascultated  -Non-Tender; No reflex tenderness; No guarding;     MEDICATIONS  (STANDING):  digoxin     Tablet 0.125milliGRAM(s) Oral daily  atorvastatin 80milliGRAM(s) Oral at bedtime  metoprolol succinate ER 25milliGRAM(s) Oral daily  ciprofloxacin   IVPB 400milliGRAM(s) IV Intermittent every 12 hours  aspirin  chewable 81milliGRAM(s) Oral daily  metroNIDAZOLE  IVPB 500milliGRAM(s) IV Intermittent every 8 hours  losartan 50milliGRAM(s) Oral daily  docusate sodium 100milliGRAM(s) Oral three times a day    MEDICATIONS  (PRN):      Vital Signs Last 24 Hrs  T(C): 37.7, Max: 37.8 (-09 @ 18:24)  T(F): 99.9, Max: 100 (06-09 @ 18:24)  HR: 69 (69 - 96)  BP: 102/76 (89/37 - 102/76)  BP(mean): 70 (70 - 76)  RR: 16 (16 - 18)  SpO2: 98% (97% - 100%)    I&O's Detail    I & Os for current day (as of 10 Jim 2017 10:04)  =============================================  IN:    IV PiggyBack: 400 ml    Oral Fluid: 200 ml    Total IN: 600 ml  ---------------------------------------------  OUT:    Voided: 300 ml    Total OUT: 300 ml  ---------------------------------------------  Total NET: 300 ml      Daily Height in cm: 167.64 (2017 10:53)    Daily     LABS:                        11.7   10.72 )-----------( 232      ( 10 Jim 2017 07:44 )             36.9     06-10    134<L>  |  98  |  13  ----------------------------<  120<H>  4.8   |  21<L>  |  1.09    Ca    9.4      10 Jim 2017 08:20  Phos  3.2     06-10  Mg     2.1     06-10    TPro  8.1  /  Alb  4.0  /  TBili  0.6  /  DBili  x   /  AST  18  /  ALT  11  /  AlkPhos  75  06-09    PT/INR - ( 10 Jim 2017 07:44 )   PT: 27.9 sec;   INR: 2.42 ratio         PTT - ( 10 Jim 2017 07:44 )  PTT:39.7 sec  Urinalysis Basic - ( 2017 14:53 )    Color: Yellow / Appearance: SL Turbid / S.007 / pH: x  Gluc: x / Ketone: Negative  / Bili: Negative / Urobili: Negative   Blood: x / Protein: Trace / Nitrite: Negative   Leuk Esterase: Moderate / RBC: 0-2 /HPF / WBC 3-5 /HPF   Sq Epi: x / Non Sq Epi: OCC /HPF / Bacteria: Few /HPF

## 2017-06-10 NOTE — PROGRESS NOTE ADULT - ATTENDING COMMENTS
Patient seen and examined.  States abdominal pain much improved.  Denies emesis.  Abdominal exam with mild LLQ tenderness, soft, nondistended.  Will change Cipro / Flagyl to zosyn.  Allow clears. On all PO medications.  Will likely need repeat imaging over next few days to rule out drainable abscess.  Ok to continue coumadin at this time as no procedures are planned. Care discussed with heart failure NPYvrose.

## 2017-06-10 NOTE — PROGRESS NOTE ADULT - ASSESSMENT
This is a 71 year old female with hx of LVAD (2015) on coumadin admitted w/ Veda II diverticulitis (~3cm cesar-colonic abscess)   - IR consulted for possible drainage (Dr Arrieta) -> collection not amenable for drainage.   - IV Abx (Cipro/Flagyl)   - CTS for LVAD management.   - NPO/IVF  - Cardiology consult placed for heart failure service follow up.   - Seen and examined w/ Dr Velez

## 2017-06-11 DIAGNOSIS — Z95.811 PRESENCE OF HEART ASSIST DEVICE: ICD-10-CM

## 2017-06-11 LAB
ANION GAP SERPL CALC-SCNC: 14 MMOL/L — SIGNIFICANT CHANGE UP (ref 5–17)
BUN SERPL-MCNC: 10 MG/DL — SIGNIFICANT CHANGE UP (ref 7–23)
CALCIUM SERPL-MCNC: 8.9 MG/DL — SIGNIFICANT CHANGE UP (ref 8.4–10.5)
CHLORIDE SERPL-SCNC: 99 MMOL/L — SIGNIFICANT CHANGE UP (ref 96–108)
CO2 SERPL-SCNC: 22 MMOL/L — SIGNIFICANT CHANGE UP (ref 22–31)
CREAT SERPL-MCNC: 0.95 MG/DL — SIGNIFICANT CHANGE UP (ref 0.5–1.3)
GLUCOSE SERPL-MCNC: 104 MG/DL — HIGH (ref 70–99)
HCT VFR BLD CALC: 33.3 % — LOW (ref 34.5–45)
HGB BLD-MCNC: 11.3 G/DL — LOW (ref 11.5–15.5)
INR BLD: 2.25 RATIO — HIGH (ref 0.88–1.16)
MCHC RBC-ENTMCNC: 31.7 PG — SIGNIFICANT CHANGE UP (ref 27–34)
MCHC RBC-ENTMCNC: 34 GM/DL — SIGNIFICANT CHANGE UP (ref 32–36)
MCV RBC AUTO: 93.2 FL — SIGNIFICANT CHANGE UP (ref 80–100)
PLATELET # BLD AUTO: 198 K/UL — SIGNIFICANT CHANGE UP (ref 150–400)
POTASSIUM SERPL-MCNC: 3.7 MMOL/L — SIGNIFICANT CHANGE UP (ref 3.5–5.3)
POTASSIUM SERPL-SCNC: 3.7 MMOL/L — SIGNIFICANT CHANGE UP (ref 3.5–5.3)
PROTHROM AB SERPL-ACNC: 25.8 SEC — HIGH (ref 10–13.1)
RBC # BLD: 3.58 M/UL — LOW (ref 3.8–5.2)
RBC # FLD: 12.2 % — SIGNIFICANT CHANGE UP (ref 10.3–14.5)
SODIUM SERPL-SCNC: 135 MMOL/L — SIGNIFICANT CHANGE UP (ref 135–145)
WBC # BLD: 10.9 K/UL — HIGH (ref 3.8–10.5)
WBC # FLD AUTO: 10.9 K/UL — HIGH (ref 3.8–10.5)

## 2017-06-11 PROCEDURE — 99232 SBSQ HOSP IP/OBS MODERATE 35: CPT

## 2017-06-11 RX ORDER — WARFARIN SODIUM 2.5 MG/1
2.5 TABLET ORAL ONCE
Qty: 0 | Refills: 0 | Status: COMPLETED | OUTPATIENT
Start: 2017-06-11 | End: 2017-06-11

## 2017-06-11 RX ORDER — POTASSIUM CHLORIDE 20 MEQ
20 PACKET (EA) ORAL ONCE
Qty: 0 | Refills: 0 | Status: COMPLETED | OUTPATIENT
Start: 2017-06-11 | End: 2017-06-11

## 2017-06-11 RX ADMIN — PIPERACILLIN AND TAZOBACTAM 25 GRAM(S): 4; .5 INJECTION, POWDER, LYOPHILIZED, FOR SOLUTION INTRAVENOUS at 13:29

## 2017-06-11 RX ADMIN — ATORVASTATIN CALCIUM 80 MILLIGRAM(S): 80 TABLET, FILM COATED ORAL at 21:29

## 2017-06-11 RX ADMIN — Medication 81 MILLIGRAM(S): at 12:13

## 2017-06-11 RX ADMIN — WARFARIN SODIUM 2.5 MILLIGRAM(S): 2.5 TABLET ORAL at 21:29

## 2017-06-11 RX ADMIN — PANTOPRAZOLE SODIUM 40 MILLIGRAM(S): 20 TABLET, DELAYED RELEASE ORAL at 06:38

## 2017-06-11 RX ADMIN — Medication 0.12 MILLIGRAM(S): at 05:24

## 2017-06-11 RX ADMIN — Medication 20 MILLIEQUIVALENT(S): at 17:26

## 2017-06-11 RX ADMIN — PIPERACILLIN AND TAZOBACTAM 25 GRAM(S): 4; .5 INJECTION, POWDER, LYOPHILIZED, FOR SOLUTION INTRAVENOUS at 21:29

## 2017-06-11 RX ADMIN — LOSARTAN POTASSIUM 50 MILLIGRAM(S): 100 TABLET, FILM COATED ORAL at 05:24

## 2017-06-11 RX ADMIN — Medication 25 MILLIGRAM(S): at 05:24

## 2017-06-11 RX ADMIN — PIPERACILLIN AND TAZOBACTAM 25 GRAM(S): 4; .5 INJECTION, POWDER, LYOPHILIZED, FOR SOLUTION INTRAVENOUS at 05:25

## 2017-06-11 NOTE — PROGRESS NOTE ADULT - ASSESSMENT
71F w/ PMR on chronic steroids, ICM s/p LVAD 2015 [facility] presenting with abdominal pain and found to have sigmoid abscess on IV abx now.  1. will follow for LVAD purposes  2. continue current cardiac medications

## 2017-06-11 NOTE — PROGRESS NOTE ADULT - ATTENDING COMMENTS
Patient seen and examined.    Doing well. States pain completely resolved  Afebrile  abd; nt / nd / soft / Benign    WBC=WNL    Plan:  1. Continue antibiotics  2. Repeat CT to assure abscess not able to be drained percutaneously.  3. Care discussed with patient and family

## 2017-06-11 NOTE — PROGRESS NOTE ADULT - ASSESSMENT
This is a 71 year old female with hx of LVAD (2015) on coumadin admitted w/ [place] II diverticulitis (~3cm cesar-colonic abscess)   - IR consulted for possible drainage (Dr Arrieta) -> collection not amenable for drainage.   - consider repeat CT Monday  - IV Abx (Cipro/Flagyl)   - CTS for LVAD management.   - CLD; May consider advancing to Reg diet today if pain/ nausea continues to improve  - Cardiology consult placed for heart failure service follow up.     LEONA Daley PGY1  Pager: [phone]

## 2017-06-11 NOTE — PROGRESS NOTE ADULT - ATTENDING COMMENTS
71 yrs s/p VAD 2015 for ischemic cardiomyopathy. PMR on steriods. Presented LLQ abdo pain June 9th. CT shows sigmoid abscess. Presenting WBC 9.0. Started cipro/flagyl to zocyn. No attempt at drainage, and no scheduled procedue. Patient still on coumadin.   MEDS: as above.  Afebrile. HR 60-70 SR MAP 70-80  WBC 10.9 Cr 10.9 71 yrs s/p VAD 2015 for ischemic cardiomyopathy. PMR on steriods. Presented LLQ abdo pain June 9th. CT shows sigmoid abscess. Presenting WBC 9.0. Started cipro/flagyl to zocyn. No attempt at drainage, and no scheduled procedue. Patient still on coumadin.   MEDS: as above.  Afebrile. HR 60-70 SR MAP 70-80  Abdo:  soft mildly tender.  WBC 10.9 Cr 10.9  Continue plan as per surgery. For repeat abdo CT tomorrow.  Daniel Isidro

## 2017-06-11 NOTE — PROGRESS NOTE ADULT - SUBJECTIVE AND OBJECTIVE BOX
Cardiology NP    Patient is a 71y old  Female who presents with a chief complaint of abdominal cramping pain (10 Jim 2017 00:05)      HPI:  70 y/o female h/o LVAD implanted  @ [facility] presents to ED c/o LLQ pain, nausea and decreased appetite x 3days. No fevers, no chills, no diarrhea, no constipation, no change in bowel habits.   Her last colonoscopy was reportedly normal in 2017. (2017 18:58)      PAST MEDICAL & SURGICAL HISTORY:  Diverticulitis  CHF (congestive heart failure)  Myocardial infarction  GERD (gastroesophageal reflux disease)  Polymyalgia rheumatica  Fibromyalgia  Acid reflux  LVAD (left ventricular assist device) present  No significant past surgical history  No significant past surgical history      MEDICATIONS  (STANDING):  digoxin     Tablet 0.125milliGRAM(s) Oral daily  atorvastatin 80milliGRAM(s) Oral at bedtime  metoprolol succinate ER 25milliGRAM(s) Oral daily  aspirin  chewable 81milliGRAM(s) Oral daily  losartan 50milliGRAM(s) Oral daily  docusate sodium 100milliGRAM(s) Oral three times a day  pantoprazole    Tablet 40milliGRAM(s) Oral before breakfast  piperacillin/tazobactam IVPB. 3.375Gram(s) IV Intermittent every 8 hours    MEDICATIONS  (PRN):      Allergies    No Known Allergies    Intolerances        REVIEW OF SYSTEMS:  Negative unless otherwise stated    Vital Signs Last 24 Hrs  T(C): 36.9, Max: 37.5 ( @ 01:00)  T(F): 98.4, Max: 99.5 ( @ 01:00)  HR: 76 (66 - 77)  BP: --  BP(mean): 76 (70 - 78)  RR: 18 (16 - 18)  SpO2: 93% (93% - 98%)    PHYSICAL EXAM:  Neuro: A&Ox3  Lungs: CTA bilaterally  COR: LVAD; left driveline incision with dsg. D&I.  Extremities: LEVIN, no edema.  Pain: Patient complains of lower left abdominal pain, but does state that the pain is decreased from yesterday.    LABS:                        11.3   10.9  )-----------( 198      ( 2017 05:31 )             33.3     -    135  |  99  |  10  ----------------------------<  104<H>  3.7   |  22  |  0.95    Ca    8.9      2017 05:31  Phos  3.2     06-10  Mg     2.1     06-10    TPro  8.1  /  Alb  4.0  /  TBili  0.6  /  DBili  x   /  AST  18  /  ALT  11  /  AlkPhos  75  06-09    PT/INR - ( 10 Jim 2017 07:44 )   PT: 27.9 sec;   INR: 2.42 ratio         PTT - ( 10 Jim 2017 07:44 )  PTT:39.7 sec  Urinalysis Basic - ( 2017 14:53 )    Color: Yellow / Appearance: SL Turbid / S.007 / pH: x  Gluc: x / Ketone: Negative  / Bili: Negative / Urobili: Negative   Blood: x / Protein: Trace / Nitrite: Negative   Leuk Esterase: Moderate / RBC: 0-2 /HPF / WBC 3-5 /HPF   Sq Epi: x / Non Sq Epi: OCC /HPF / Bacteria: Few /HPF      CAPILLARY BLOOD GLUCOSE      RADIOLOGY & ADDITIONAL TESTS:    Assessment/Plan:          Follow-up: Cardiology NP    Patient is a 71y old  Female who presents with a chief complaint of abdominal cramping pain (10 Jim 2017 00:05)      HPI:  72 y/o female h/o LVAD implanted  @ [facility] presents to ED c/o LLQ pain, nausea and decreased appetite x 3days. No fevers, no chills, no diarrhea, no constipation, no change in bowel habits.   Her last colonoscopy was reportedly normal in 2017. (2017 18:58)      PAST MEDICAL & SURGICAL HISTORY:  Diverticulitis  CHF (congestive heart failure)  Myocardial infarction  GERD (gastroesophageal reflux disease)  Polymyalgia rheumatica  Fibromyalgia  Acid reflux  LVAD (left ventricular assist device) present  No significant past surgical history  No significant past surgical history      MEDICATIONS  (STANDING):  digoxin     Tablet 0.125milliGRAM(s) Oral daily  atorvastatin 80milliGRAM(s) Oral at bedtime  metoprolol succinate ER 25milliGRAM(s) Oral daily  aspirin  chewable 81milliGRAM(s) Oral daily  losartan 50milliGRAM(s) Oral daily  docusate sodium 100milliGRAM(s) Oral three times a day  pantoprazole    Tablet 40milliGRAM(s) Oral before breakfast  piperacillin/tazobactam IVPB. 3.375Gram(s) IV Intermittent every 8 hours    MEDICATIONS  (PRN):      Allergies    No Known Allergies    Intolerances        REVIEW OF SYSTEMS:  Negative unless otherwise stated    Vital Signs Last 24 Hrs  T(C): 36.9, Max: 37.5 ( @ 01:00)  T(F): 98.4, Max: 99.5 ( @ 01:00)  HR: 76 (66 - 77)  BP: --  BP(mean): 76 (70 - 78)  RR: 18 (16 - 18)  SpO2: 93% (93% - 98%)    PHYSICAL EXAM:  Neuro: A&Ox3  Lungs: CTA bilaterally  COR: LVAD; left driveline incision with dsg. D&I. telemetry: NSR 70s-80s with PVCs.  Extremities: LEVIN, no edema.  Pain: Patient complains of lower left abdominal pain, but does state that the pain is decreased from yesterday.    LABS:                        11.3   10.9  )-----------( 198      ( 2017 05:31 )             33.3         135  |  99  |  10  ----------------------------<  104<H>  3.7   |  22  |  0.95    Ca    8.9      2017 05:31  Phos  3.2     06-10  Mg     2.1     06-10    TPro  8.1  /  Alb  4.0  /  TBili  0.6  /  DBili  x   /  AST  18  /  ALT  11  /  AlkPhos  75  06-09    PT/INR - ( 10 Jim 2017 07:44 )   PT: 27.9 sec;   INR: 2.42 ratio         PTT - ( 10 Jim 2017 07:44 )  PTT:39.7 sec  Urinalysis Basic - ( 2017 14:53 )    Color: Yellow / Appearance: SL Turbid / S.007 / pH: x  Gluc: x / Ketone: Negative  / Bili: Negative / Urobili: Negative   Blood: x / Protein: Trace / Nitrite: Negative   Leuk Esterase: Moderate / RBC: 0-2 /HPF / WBC 3-5 /HPF   Sq Epi: x / Non Sq Epi: OCC /HPF / Bacteria: Few /HPF      CAPILLARY BLOOD GLUCOSE      RADIOLOGY & ADDITIONAL TESTS:    Assessment/Plan:          Follow-up:

## 2017-06-11 NOTE — PROGRESS NOTE ADULT - SUBJECTIVE AND OBJECTIVE BOX
GENERAL SURGERY DAILY PROGRESS NOTE:       Subjective:  mild RLQ pain, denies n/v/f/c, denies SOB or CP. Reports passing flatus and having bowel movements.     Objective:  General: NAD  HEENT: WNL  Lymph nodes: Non-tender, no palpable masses  Neck: No masses  Cardiovascular: Regular rate and rhythm; normal S1, S2; no murmurs, rubs, or gallops  Lungs: Lungs clear to auscultation bilaterally; No wheezes or crackles    Abdominal:  -Abdomen soft and non-distended  -No pulsatile masses, no abdominal bruits ascultated  - LLQ tenderness improved. No reflex tenderness; No guarding;     MEDICATIONS  (STANDING):  digoxin     Tablet 0.125milliGRAM(s) Oral daily  atorvastatin 80milliGRAM(s) Oral at bedtime  metoprolol succinate ER 25milliGRAM(s) Oral daily  ciprofloxacin   IVPB 400milliGRAM(s) IV Intermittent every 12 hours  aspirin  chewable 81milliGRAM(s) Oral daily  metroNIDAZOLE  IVPB 500milliGRAM(s) IV Intermittent every 8 hours  losartan 50milliGRAM(s) Oral daily  docusate sodium 100milliGRAM(s) Oral three times a day    MEDICATIONS  (PRN):      Vital Signs Last 24 Hrs  T(C): 36.9, Max: 37.5 (06-11 @ 01:00)  T(F): 98.4, Max: 99.5 (06-11 @ 01:00)  HR: 76 (70 - 77)  BP: --  BP(mean): 76 (70 - 78)  RR: 18 (18 - 18)  SpO2: 93% (93% - 98%)    I&O's Detail  I & Os for 24h ending 11 Jun 2017 07:00  =============================================  IN:    Oral Fluid: 980 ml    IV PiggyBack: 300 ml    Total IN: 1280 ml  ---------------------------------------------  OUT:    Voided: 400 ml    Total OUT: 400 ml  ---------------------------------------------  Total NET: 880 ml    I & Os for current day (as of 11 Jun 2017 15:09)  =============================================  IN:    Oral Fluid: 1080 ml    Total IN: 1080 ml  ---------------------------------------------  OUT:    Voided: 500 ml    Total OUT: 500 ml  ---------------------------------------------  Total NET: 580 ml                            11.3   10.9  )-----------( 198      ( 11 Jun 2017 05:31 )             33.3       06-11    135  |  99  |  10  ----------------------------<  104<H>  3.7   |  22  |  0.95    Ca    8.9      11 Jun 2017 05:31  Phos  3.2     06-10  Mg     2.1     06-10 GENERAL SURGERY DAILY PROGRESS NOTE:       Subjective:  mild RLQ pain, denies n/v/f/c, denies SOB or CP. Reports passing flatus and having bowel movements.     Objective:  General: NAD  HEENT: WNL  Lymph nodes: Non-tender, no palpable masses  Neck: No masses  Abdominal:  -Abdomen soft and non-distended  -No pulsatile masses, no abdominal bruits ascultated  - LLQ tenderness improved. No reflex tenderness; No guarding;     MEDICATIONS  (STANDING):  digoxin     Tablet 0.125milliGRAM(s) Oral daily  atorvastatin 80milliGRAM(s) Oral at bedtime  metoprolol succinate ER 25milliGRAM(s) Oral daily  ciprofloxacin   IVPB 400milliGRAM(s) IV Intermittent every 12 hours  aspirin  chewable 81milliGRAM(s) Oral daily  metroNIDAZOLE  IVPB 500milliGRAM(s) IV Intermittent every 8 hours  losartan 50milliGRAM(s) Oral daily  docusate sodium 100milliGRAM(s) Oral three times a day        Vital Signs Last 24 Hrs  T(C): 36.9, Max: 37.5 (06-11 @ 01:00)  T(F): 98.4, Max: 99.5 (06-11 @ 01:00)  HR: 76 (70 - 77)  SpO2: 93% (93% - 98%)               11.3   10.9  )-----------( 198      ( 11 Jun 2017 05:31 )             33.3       06-11    135  |  99  |  10  ----------------------------<  104<H>  3.7   |  22  |  0.95    Ca    8.9      11 Jun 2017 05:31  Phos  3.2     06-10  Mg     2.1     06-10

## 2017-06-11 NOTE — PROGRESS NOTE ADULT - PROBLEM SELECTOR PLAN 1
-repeat CT of abdomen/pelvis to assess abcess  -continue IV ABX: zosyn -repeat CT of abdomen/pelvis 6/12/17 to assess abcess  -continue IV ABX: zosyn

## 2017-06-11 NOTE — PROGRESS NOTE ADULT - SUBJECTIVE AND OBJECTIVE BOX
HPI:  72 y/o female h/o LVAD implanted 2015 @ [facility] presents to ED c/o LLQ pain, nausea and decreased appetite x 3days. No fevers, no chills, no diarrhea, no constipation, no change in bowel habits. Her last colonoscopy was reportedly normal in 1/2017. (09 Jun 2017 18:58)    Review Of Systems:  Constitutional: [ ] Fever [ ] Chills [ ] Fatigue [ ] Weight change   HEENT: [ ] Blurred vision [ ] Eye Pain [ ] Headache [ ] Runny nose [ ] Sore Throat   Respiratory: [ ] Cough [ ] Wheezing [ ] Shortness of breath  Cardiovascular: [ ] Chest Pain [ ] Palpitations [ ] BOWLES [ ] PND [ ] Orthopnea  Gastrointestinal: [ ] Abdominal Pain [ ] Diarrhea [ ] Constipation [ ] Hemorrhoids [ ] Nausea [ ] Vomiting  Genitourinary: [ ] Nocturia [ ] Dysuria [ ] Incontinence  Extremities: [ ] Swelling [ ] Joint Pain  Neurologic: [ ] Focal deficit [ ] Paresthesias [ ] Syncope  Lymphatic: [ ] Swelling [ ] Lymphadenopathy   Skin: [ ] Rash [ ] Ecchymoses [ ] Wounds [ ] Lesions  Psychiatry: [ ] Depression [ ] Suicidal/Homicidal Ideation [ ] Anxiety [ ] Sleep Disturbances  [x] 10 point review of systems is otherwise negative except as mentioned above            [ ]Unable to obtain    Medications:  digoxin     Tablet 0.125milliGRAM(s) Oral daily  atorvastatin 80milliGRAM(s) Oral at bedtime  metoprolol succinate ER 25milliGRAM(s) Oral daily  aspirin  chewable 81milliGRAM(s) Oral daily  losartan 50milliGRAM(s) Oral daily  docusate sodium 100milliGRAM(s) Oral three times a day  pantoprazole    Tablet 40milliGRAM(s) Oral before breakfast  piperacillin/tazobactam IVPB. 3.375Gram(s) IV Intermittent every 8 hours    Vitals:  T(C): 36.9, Max: 37.5 (06-11 @ 01:00)  HR: 76 (66 - 77)  BP(mean): 76 (70 - 78)  RR: 18 (16 - 18)  SpO2: 93% (93% - 98%)    I&O's Summary    I & Os for current day (as of 11 Jun 2017 11:16)  =============================================  IN: 1280 ml / OUT: 400 ml / NET: 880 ml    Physical Exam:  Appearance: [ ] Normal [ ] NAD  Eyes: [ ] PERRL [ ] EOMI  HENT: [ ] Normal oral muscosa [ ]NC/AT  Cardiovascular: [ ] S1 [ ] S2 [ ] RRR [ ] No m/r/g [ ]No edema [ ] JVP  Procedural Access Site: [ ] No hematoma [ ] Non-tender to palpation [ ] 2+ pulse [ ] No bruit [ ] No Ecchymosis  Respiratory: [ ] Clear to auscultation bilaterally  Gastrointestinal: [ ] Soft [ ] Non-tender [ ] Non-distended [ ] BS+  Musculoskeletal: [ ] No clubbing [ ] No joint deformity   Neurologic: [ ] Non-focal  Lymphatic: [ ] No lymphadenopathy  Psychiatry: [ ] AAOx3 [ ] Mood & affect appropriate  Skin: [ ] No rashes [ ] No ecchymoses [ ] No cyanosis    LVAD Interrogation:  Pump Flow:  Pump Speed:  Pulse Index:  Pump Power:  VAD Events:   Driveline evaluation:    Programming Changes: [ ] No changes made [ ] Changes made:  Labs:                        11.3   10.9  )-----------( 198      ( 11 Jun 2017 05:31 )             33.3     06-11    135  |  99  |  10  ----------------------------<  104<H>  3.7   |  22  |  0.95    Ca    8.9      11 Jun 2017 05:31  Phos  3.2     06-10  Mg     2.1     06-10    TPro  8.1  /  Alb  4.0  /  TBili  0.6  /  DBili  x   /  AST  18  /  ALT  11  /  AlkPhos  75  06-09    PT/INR - ( 11 Jun 2017 08:45 )   PT: 25.8 sec;   INR: 2.25 ratio      PTT - ( 10 Jim 2017 07:44 )  PTT:39.7 sec    -----------------------------------------------------------------  [ ] Congestive Heart Failure                  [ ] Acute                                                [ ] Acute on Chronic     [ ] Chronic  [ ] Diastolic (HFpEF)  [ ] Systolic (HFrEF)  [ ] Combined Systolic & Diastolic      [ ] ACC/AHA Stage: _____     [ ] NYHA Class: _____  -----------------------------------------------------------------  [ ] ISABELLE                                                    [ ] CKD I  [ ] ATN                                                   [ ] CKD II  [ ] Other: _____                                       [ ] CKD III                                                                [ ] CKD IV                                                                [ ] CKD V                                                                [ ] ESRD  -----------------------------------------------------------------  Abnormal Nutritional Status:  [ ] Malnutrtion (see nutrition note)  [ ] Underweight: BMI < 19  [ ] Morbid Obeisty: BMI >/ 40  [ ] Cachexia  [ ] Other HPI:  70 y/o female h/o LVAD implanted 2015 @ [facility] presents to ED c/o LLQ pain, nausea and decreased appetite x 3days. No fevers, no chills, no diarrhea, no constipation, no change in bowel habits. Her last colonoscopy was reportedly normal in 1/2017. (09 Jun 2017 18:58)    Admitted for sigmoid abscess and being treated with IV abx    Review Of Systems:  Constitutional: [ ] Fever [ ] Chills [ ] Fatigue [ ] Weight change   HEENT: [ ] Blurred vision [ ] Eye Pain [ ] Headache [ ] Runny nose [ ] Sore Throat   Respiratory: [ ] Cough [ ] Wheezing [ ] Shortness of breath  Cardiovascular: [ ] Chest Pain [ ] Palpitations [ ] BOWLES [ ] PND [ ] Orthopnea  Gastrointestinal: [ x] Abdominal Pain [ ] Diarrhea [ ] Constipation [ ] Hemorrhoids [ ] Nausea [ ] Vomiting  Genitourinary: [ ] Nocturia [ ] Dysuria [ ] Incontinence  Extremities: [ ] Swelling [ ] Joint Pain  Neurologic: [ ] Focal deficit [ ] Paresthesias [ ] Syncope  Lymphatic: [ ] Swelling [ ] Lymphadenopathy   Skin: [ ] Rash [ ] Ecchymoses [ ] Wounds [ ] Lesions  Psychiatry: [ ] Depression [ ] Suicidal/Homicidal Ideation [ ] Anxiety [ ] Sleep Disturbances  [x] 10 point review of systems is otherwise negative except as mentioned above            [ ]Unable to obtain    Medications:  digoxin     Tablet 0.125milliGRAM(s) Oral daily  atorvastatin 80milliGRAM(s) Oral at bedtime  metoprolol succinate ER 25milliGRAM(s) Oral daily  aspirin  chewable 81milliGRAM(s) Oral daily  losartan 50milliGRAM(s) Oral daily  docusate sodium 100milliGRAM(s) Oral three times a day  pantoprazole    Tablet 40milliGRAM(s) Oral before breakfast  piperacillin/tazobactam IVPB. 3.375Gram(s) IV Intermittent every 8 hours    Vitals:  T(C): 36.9, Max: 37.5 (06-11 @ 01:00)  HR: 76 (66 - 77)  BP(mean): 76 (70 - 78)  RR: 18 (16 - 18)  SpO2: 93% (93% - 98%)    I&O's Summary    I & Os for current day (as of 11 Jun 2017 11:16)  =============================================  IN: 1280 ml / OUT: 400 ml / NET: 880 ml    Physical Exam:  Appearance: [x ] Normal [x ] NAD  Eyes: [ x] PERRL [x ] EOMI  HENT: [x ] Normal oral muscosa [x ]NC/AT  Cardiovascular: LvAD hum  Respiratory: [ x] Clear to auscultation bilaterally  Gastrointestinal: [x ] Soft   Musculoskeletal: [ x] No clubbing [x ] No joint deformity   Neurologic: [ x] Non-focal  Lymphatic: [ x] No lymphadenopathy  Psychiatry: [ x] AAOx3 [ x] Mood & affect appropriate  Skin: [x ] No rashes [ x] No ecchymoses [x ] No cyanosis    LVAD Interrogation:  Pump Flow: 4.3 L.min  Pump Speed: 8800 rpm  Pulse Index: 4.3  Pump Power: 5.2  VAD Events: none  Driveline evaluation: no erythema    Programming Changes: [ x] No changes made [ ] Changes made:  Labs:                        11.3   10.9  )-----------( 198      ( 11 Jun 2017 05:31 )             33.3     06-11    135  |  99  |  10  ----------------------------<  104<H>  3.7   |  22  |  0.95    Ca    8.9      11 Jun 2017 05:31  Phos  3.2     06-10  Mg     2.1     06-10    TPro  8.1  /  Alb  4.0  /  TBili  0.6  /  DBili  x   /  AST  18  /  ALT  11  /  AlkPhos  75  06-09    PT/INR - ( 11 Jun 2017 08:45 )   PT: 25.8 sec;   INR: 2.25 ratio      PTT - ( 10 Jim 2017 07:44 )  PTT:39.7 sec    -----------------------------------------------------------------  [ ] Congestive Heart Failure                  [ ] Acute                                                [ ] Acute on Chronic     [ ] Chronic  [ ] Diastolic (HFpEF)  [ ] Systolic (HFrEF)  [ ] Combined Systolic & Diastolic      [ ] ACC/AHA Stage: _____     [ ] NYHA Class: _____  -----------------------------------------------------------------  [ ] ISABELLE                                                    [ ] CKD I  [ ] ATN                                                   [ ] CKD II  [ ] Other: _____                                       [ ] CKD III                                                                [ ] CKD IV                                                                [ ] CKD V                                                                [ ] ESRD  -----------------------------------------------------------------  Abnormal Nutritional Status:  [ ] Malnutrtion (see nutrition note)  [ ] Underweight: BMI < 19  [ ] Morbid Obeisty: BMI >/ 40  [ ] Cachexia  [ ] Other

## 2017-06-12 DIAGNOSIS — M35.3 POLYMYALGIA RHEUMATICA: ICD-10-CM

## 2017-06-12 DIAGNOSIS — I10 ESSENTIAL (PRIMARY) HYPERTENSION: ICD-10-CM

## 2017-06-12 DIAGNOSIS — K21.9 GASTRO-ESOPHAGEAL REFLUX DISEASE WITHOUT ESOPHAGITIS: ICD-10-CM

## 2017-06-12 DIAGNOSIS — K57.20 DIVERTICULITIS OF LARGE INTESTINE WITH PERFORATION AND ABSCESS WITHOUT BLEEDING: ICD-10-CM

## 2017-06-12 DIAGNOSIS — I50.9 HEART FAILURE, UNSPECIFIED: ICD-10-CM

## 2017-06-12 DIAGNOSIS — I50.22 CHRONIC SYSTOLIC (CONGESTIVE) HEART FAILURE: ICD-10-CM

## 2017-06-12 DIAGNOSIS — K57.32 DIVERTICULITIS OF LARGE INTESTINE WITHOUT PERFORATION OR ABSCESS WITHOUT BLEEDING: ICD-10-CM

## 2017-06-12 LAB
ANION GAP SERPL CALC-SCNC: 11 MMOL/L — SIGNIFICANT CHANGE UP (ref 5–17)
BUN SERPL-MCNC: 8 MG/DL — SIGNIFICANT CHANGE UP (ref 7–23)
CALCIUM SERPL-MCNC: 9 MG/DL — SIGNIFICANT CHANGE UP (ref 8.4–10.5)
CHLORIDE SERPL-SCNC: 103 MMOL/L — SIGNIFICANT CHANGE UP (ref 96–108)
CO2 SERPL-SCNC: 24 MMOL/L — SIGNIFICANT CHANGE UP (ref 22–31)
CREAT SERPL-MCNC: 0.87 MG/DL — SIGNIFICANT CHANGE UP (ref 0.5–1.3)
GLUCOSE SERPL-MCNC: 104 MG/DL — HIGH (ref 70–99)
HCT VFR BLD CALC: 33.2 % — LOW (ref 34.5–45)
HGB BLD-MCNC: 11.1 G/DL — LOW (ref 11.5–15.5)
INR BLD: 2.22 RATIO — HIGH (ref 0.88–1.16)
MCHC RBC-ENTMCNC: 31.3 PG — SIGNIFICANT CHANGE UP (ref 27–34)
MCHC RBC-ENTMCNC: 33.4 GM/DL — SIGNIFICANT CHANGE UP (ref 32–36)
MCV RBC AUTO: 93.7 FL — SIGNIFICANT CHANGE UP (ref 80–100)
PLATELET # BLD AUTO: 215 K/UL — SIGNIFICANT CHANGE UP (ref 150–400)
POTASSIUM SERPL-MCNC: 4 MMOL/L — SIGNIFICANT CHANGE UP (ref 3.5–5.3)
POTASSIUM SERPL-SCNC: 4 MMOL/L — SIGNIFICANT CHANGE UP (ref 3.5–5.3)
PROTHROM AB SERPL-ACNC: 25.5 SEC — HIGH (ref 10–13.1)
RBC # BLD: 3.54 M/UL — LOW (ref 3.8–5.2)
RBC # FLD: 12.3 % — SIGNIFICANT CHANGE UP (ref 10.3–14.5)
SODIUM SERPL-SCNC: 138 MMOL/L — SIGNIFICANT CHANGE UP (ref 135–145)
WBC # BLD: 8.4 K/UL — SIGNIFICANT CHANGE UP (ref 3.8–10.5)
WBC # FLD AUTO: 8.4 K/UL — SIGNIFICANT CHANGE UP (ref 3.8–10.5)

## 2017-06-12 PROCEDURE — 74177 CT ABD & PELVIS W/CONTRAST: CPT | Mod: 26

## 2017-06-12 PROCEDURE — 99223 1ST HOSP IP/OBS HIGH 75: CPT

## 2017-06-12 PROCEDURE — 93750 INTERROGATION VAD IN PERSON: CPT

## 2017-06-12 PROCEDURE — 99232 SBSQ HOSP IP/OBS MODERATE 35: CPT

## 2017-06-12 PROCEDURE — 99233 SBSQ HOSP IP/OBS HIGH 50: CPT | Mod: 25,GC

## 2017-06-12 RX ORDER — WARFARIN SODIUM 2.5 MG/1
2.5 TABLET ORAL ONCE
Qty: 0 | Refills: 0 | Status: COMPLETED | OUTPATIENT
Start: 2017-06-12 | End: 2017-06-12

## 2017-06-12 RX ORDER — FUROSEMIDE 40 MG
40 TABLET ORAL DAILY
Qty: 0 | Refills: 0 | Status: DISCONTINUED | OUTPATIENT
Start: 2017-06-12 | End: 2017-06-14

## 2017-06-12 RX ORDER — ERTAPENEM SODIUM 1 G/1
1000 INJECTION, POWDER, LYOPHILIZED, FOR SOLUTION INTRAMUSCULAR; INTRAVENOUS EVERY 24 HOURS
Qty: 0 | Refills: 0 | Status: DISCONTINUED | OUTPATIENT
Start: 2017-06-13 | End: 2017-06-14

## 2017-06-12 RX ORDER — ERTAPENEM SODIUM 1 G/1
1000 INJECTION, POWDER, LYOPHILIZED, FOR SOLUTION INTRAMUSCULAR; INTRAVENOUS ONCE
Qty: 0 | Refills: 0 | Status: COMPLETED | OUTPATIENT
Start: 2017-06-12 | End: 2017-06-12

## 2017-06-12 RX ORDER — ERTAPENEM SODIUM 1 G/1
INJECTION, POWDER, LYOPHILIZED, FOR SOLUTION INTRAMUSCULAR; INTRAVENOUS
Qty: 0 | Refills: 0 | Status: DISCONTINUED | OUTPATIENT
Start: 2017-06-12 | End: 2017-06-14

## 2017-06-12 RX ORDER — POTASSIUM CHLORIDE 20 MEQ
20 PACKET (EA) ORAL DAILY
Qty: 0 | Refills: 0 | Status: DISCONTINUED | OUTPATIENT
Start: 2017-06-12 | End: 2017-06-14

## 2017-06-12 RX ADMIN — Medication 20 MILLIEQUIVALENT(S): at 19:02

## 2017-06-12 RX ADMIN — PANTOPRAZOLE SODIUM 40 MILLIGRAM(S): 20 TABLET, DELAYED RELEASE ORAL at 05:05

## 2017-06-12 RX ADMIN — Medication 81 MILLIGRAM(S): at 11:13

## 2017-06-12 RX ADMIN — LOSARTAN POTASSIUM 50 MILLIGRAM(S): 100 TABLET, FILM COATED ORAL at 05:05

## 2017-06-12 RX ADMIN — PIPERACILLIN AND TAZOBACTAM 25 GRAM(S): 4; .5 INJECTION, POWDER, LYOPHILIZED, FOR SOLUTION INTRAVENOUS at 05:04

## 2017-06-12 RX ADMIN — Medication 25 MILLIGRAM(S): at 05:04

## 2017-06-12 RX ADMIN — ERTAPENEM SODIUM 120 MILLIGRAM(S): 1 INJECTION, POWDER, LYOPHILIZED, FOR SOLUTION INTRAMUSCULAR; INTRAVENOUS at 16:34

## 2017-06-12 RX ADMIN — WARFARIN SODIUM 2.5 MILLIGRAM(S): 2.5 TABLET ORAL at 22:08

## 2017-06-12 RX ADMIN — Medication 40 MILLIGRAM(S): at 19:02

## 2017-06-12 RX ADMIN — ATORVASTATIN CALCIUM 80 MILLIGRAM(S): 80 TABLET, FILM COATED ORAL at 22:08

## 2017-06-12 RX ADMIN — Medication 0.12 MILLIGRAM(S): at 05:04

## 2017-06-12 NOTE — CONSULT NOTE ADULT - ASSESSMENT
71 year old female with a PMHx of severe CHF s/p LVAD, PMR on chronic prednisone, HTN, GERD p/w acute diverticulitis with abscess.

## 2017-06-12 NOTE — CONSULT NOTE ADULT - PROBLEM SELECTOR RECOMMENDATION 2
1. Cards/CHF team f/u appreciated.  2. Anticoagulation per cardiology. INR is therapeutic. Would dose coumadin again tonight.  3. Dose lasix PRN.  4. Continue toprol XL and ARB.  5. Continue digoxin - would obtain baseline EKG as well.

## 2017-06-12 NOTE — PROGRESS NOTE ADULT - ASSESSMENT
This is a 71 year old female with hx of LVAD (2015) on coumadin admitted w/ Hinchey II diverticulitis (~3cm cesar-colonic abscess)   - CT abdomen/pelvis  - IV Abx (Cipro/Flagyl)   - CTS for LVAD management  - ID consult Dr. Helms for antibiotic recommendation  Elisabeth Esposito PA-C  Page [phone]  - CLD; May consider advancing to Reg diet today if pain/ nausea continues to improve  - Cardiology consult placed for heart failure service follow up.     LEONA Daley PGY1  Pager: [phone] This is a 71 year old female with hx of LVAD (2015) on coumadin admitted w/ Hinchey II diverticulitis (~3cm cesar-colonic abscess)   - CT abdomen/pelvis today  - NPO for CT scan and possible drainage of abscess  - IV Abx (Cipro/Flagyl)   - CTS for LVAD management  - ID consult Dr. Helms for antibiotic recommendation  Elisabeth Esposito PA-C  Page [phone]

## 2017-06-12 NOTE — PROGRESS NOTE ADULT - SUBJECTIVE AND OBJECTIVE BOX
Patient discussed on morning rounds with Dr. Campo   Operation / Date: LVAD implantation - Heartmate II (2015) at [facility]     SUBJECTIVE ASSESSMENT:  70 y/o F s/p LVAD implantation 2015 at [facility] on Coumadin, recently admitted to [facility] for Hinchey II diverticulitis (~3cm pericolonic abscess). Was started on Cipro/Flagyl, switched to IV. Followed by General Surgery, Heart Failure.  Currently on IV Zosyn, tolerating well. Denies any symptoms at this time.     Vital Signs Last 24 Hrs  LVAD pump flow 3.4, PI 3.8, 8800 rpm   T(C): 36.9, Max: 37.1 (06-11 @ 17:00)  T(F): 98.4, Max: 98.8 (06-11 @ 17:00)  HR: 64 (64 - 78)  BP(mean): 72 (72 - 78)  RR: 18 (18 - 18)  SpO2: 98% (93% - 98%)  I & Os for current day (as of 12 Jun 2017 11:38)  IN:    Oral Fluid: 1400 ml    Solution: 100 ml  Total IN: 1500 ml  OUT:    Voided: 500 ml  Total OUT: 500 ml  ---------------------------------------------  Total NET: 1000 ml      CHEST TUBE:  Yes/No. AIR LEAKS: Yes/No. Suction / H2O SEAL.   ANY DRAIN:  Yes/No.  EPICARDIAL WIRES: Yes/No.  TIE DOWNS: Yes/No.  HAMPTON: Yes/No.    PHYSICAL EXAM:  General: NAD, seated in bed   Cardiovascular: +LVAD hum  Respiratory: CTA B/L  Gastrointestinal: soft, nontender, nondistended +BS, no pulsatile mass. Driveline site C/D/I with DSD   Extremities: no cyanosis, no edema B/L. Warm, well perfused     LABS:                        11.1   8.4   )-----------( 215      ( 12 Jun 2017 04:25 )             33.2     PT/INR - ( 12 Jun 2017 05:41 )   PT: 25.5 sec;   INR: 2.22 ratio       06-12    138  |  103  |  8   ----------------------------<  104<H>  4.0   |  24  |  0.87    Ca    9.0      12 Jun 2017 04:25    MEDICATIONS  (STANDING):  digoxin     Tablet 0.125milliGRAM(s) Oral daily  atorvastatin 80milliGRAM(s) Oral at bedtime  metoprolol succinate ER 25milliGRAM(s) Oral daily  aspirin  chewable 81milliGRAM(s) Oral daily  losartan 50milliGRAM(s) Oral daily  pantoprazole    Tablet 40milliGRAM(s) Oral before breakfast  piperacillin/tazobactam IVPB. 3.375Gram(s) IV Intermittent every 8 hours  warfarin 2.5milliGRAM(s) Oral once    RADIOLOGY & ADDITIONAL TESTS:   CT abd/pelvis no contrast 6/9/17  IMPRESSION: Sigmoid diverticulitis with adjacent 3.4 cm abscess.  Left adnexa is inseparable from this process.

## 2017-06-12 NOTE — CONSULT NOTE ADULT - PROBLEM SELECTOR RECOMMENDATION 9
This immunocompromised patient (PMR on steroids--and advanced cardiomyopathy with LVAD) has diverticulitis with a small abscess--initially not amenable to drainage.  She is improving on IV zosyn.  I am not confident that the benefits of oral abx transition outweighs risks of her decompensating on oral abx and the potential effects this would present to her compromised cardiac system.  Quinolones are a challenge because of durg interactions with warfarin.    I have d/w Dr. Ortega and I am inclined to recommend a PICC line and IV invanz for another 'few' weeks---unless the CT shows completely resolved abscess and 'near- normal' bowel on CT (seems unlikely)    I think we can change zosyn to simplified regimen of 1 g/day invanz.
1. Continue empiric zosyn.  2. F/U BCx  3. May need prolonged course of antimicrobial therapy given VAD status. ID consult to be called - ? role for prolonged IV therapy  4. Repeat CT A/P today to re-evaluate abscess and see if it is amenable to drainage. Hold lasix as patient appears euvolemic and to reduce risk for contrast nephropathy.

## 2017-06-12 NOTE — PROGRESS NOTE ADULT - PROBLEM SELECTOR PLAN 1
complicated diverticulitis with small pericolonic abscess/phlegmon. clinically improved, CT images today reviewed showing stable findings. will consult ID for long-term IV abx. can advance diet and anticipate discharge home in 1-2 days. will refer to colorectal surgery for outpatient colonoscopy in 6-8 weeks

## 2017-06-12 NOTE — PROGRESS NOTE ADULT - ASSESSMENT
71F w/ PMR on chronic steroids, ICM s/p LVAD 2015 [facility] presenting with abdominal pain and found to have sigmoid abscess on IV abx now.  1. will follow for LVAD purposes  2. continue current cardiac medications 71F w/ PMR on chronic steroids, ICM s/p LVAD 2015 @ [facility] presenting with abdominal pain and found to have diverticulitis and sigmoid abscess on IV Zosyn now.  - Will follow for LVAD purposes  - Continue current cardiac medications   - Await results of repeat CT abd 71 year old woman with ischemic cardiomyopathy leading to chronic systolic heart failure, s/p LVAD (2015) presenting with acute diverticulitis, now improving on IV antibiotics.

## 2017-06-12 NOTE — CHART NOTE - NSCHARTNOTEFT_GEN_A_CORE
ID consulted   Recommendation is for PICC line and IV Invanz 1g/day for two weeks considering patient's PMH of LVAD and PMR on steroids   Will request PICC line  Will switch to IV Invanz 1g/day as per ID

## 2017-06-12 NOTE — CONSULT NOTE ADULT - PROBLEM SELECTOR PROBLEM 1
Diverticulitis of large intestine with abscess without bleeding
Diverticulitis of large intestine with abscess without bleeding

## 2017-06-12 NOTE — CHART NOTE - NSCHARTNOTEFT_GEN_A_CORE
CT scan with phlegmon, no drainable abscess- ID recommends IV Invanz for 2 weeks- patient will need PICC- trial of LRD

## 2017-06-12 NOTE — PROGRESS NOTE ADULT - SUBJECTIVE AND OBJECTIVE BOX
GENERAL SURGERY DAILY PROGRESS NOTE:       SUBJECTIVE/ROS:   [x] A ten-point review of systems was otherwise negative except as noted above.       MEDICATIONS  (STANDING):  digoxin     Tablet 0.125milliGRAM(s) Oral daily  atorvastatin 80milliGRAM(s) Oral at bedtime  metoprolol succinate ER 25milliGRAM(s) Oral daily  aspirin  chewable 81milliGRAM(s) Oral daily  losartan 50milliGRAM(s) Oral daily  pantoprazole    Tablet 40milliGRAM(s) Oral before breakfast  warfarin 2.5milliGRAM(s) Oral once  ertapenem  IVPB  IV Intermittent     MEDICATIONS  (PRN):      OBJECTIVE:    Vital Signs Last 24 Hrs  T(C): 36.7, Max: 37 (-11 @ 21:00)  T(F): 98.1, Max: 98.6 (11 @ 21:00)  HR: 68 (64 - 76)  BP: --  BP(mean): 78 (72 - 78)  RR: 18 (18 - 18)  SpO2: 97% (96% - 98%)    I&O's Detail  I & Os for 24h ending 2017 07:00  =============================================  IN:    Oral Fluid: 1400 ml    Solution: 100 ml    Total IN: 1500 ml  ---------------------------------------------  OUT:    Voided: 500 ml    Total OUT: 500 ml  ---------------------------------------------  Total NET: 1000 ml    I & Os for current day (as of 2017 17:01)  =============================================  IN:    Oral Fluid: 120 ml    Total IN: 120 ml  ---------------------------------------------  OUT:    Total OUT: 0 ml  ---------------------------------------------  Total NET: 120 ml      Daily     Daily Weight in k.6 (2017 08:00)    LABS:                        11.1   8.4   )-----------( 215      ( 2017 04:25 )             33.2     06-12    138  |  103  |  8   ----------------------------<  104<H>  4.0   |  24  |  0.87    Ca    9.0      2017 04:25      PT/INR - ( 2017 05:41 )   PT: 25.5 sec;   INR: 2.22 ratio                       PHYSICAL EXAM:  Constitutional: well developed, well nourished, NAD  Eyes: anicteric  ENMT: normal facies, symmetric  Neck: supple  Respiratory: CTA bilaterally  Cardiovascular: RRR  Gastrointestinal: abdomen soft, nontender, nondistended. No obvious masses. No peritonitis  Extremities: FROM, warm  Neurological: intact, non-focal  Skin: no gross lesions  Lymph Nodes: no gross adenopathy  Musculoskeletal: equal strength bilateral upper and lower extremities  Psychiatric: oriented x 3; appropriate  Rectal:    Elisabeth Esposito PA-C  Pager 90 [facility] DAILY PROGRESS NOTE:       SUBJECTIVE/ROS: Pt feels well, tolerated clear liquids- denies nausea/vomiting  [x] A ten-point review of systems was otherwise negative except as noted above.       MEDICATIONS  (STANDING):  digoxin     Tablet 0.125milliGRAM(s) Oral daily  atorvastatin 80milliGRAM(s) Oral at bedtime  metoprolol succinate ER 25milliGRAM(s) Oral daily  aspirin  chewable 81milliGRAM(s) Oral daily  losartan 50milliGRAM(s) Oral daily  pantoprazole    Tablet 40milliGRAM(s) Oral before breakfast  warfarin 2.5milliGRAM(s) Oral once  ertapenem  IVPB  IV Intermittent     MEDICATIONS  (PRN):      OBJECTIVE:    Vital Signs Last 24 Hrs  T(C): 36.7, Max: 37 (-11 @ 21:00)  T(F): 98.1, Max: 98.6 (-11 @ 21:00)  HR: 68 (64 - 76)  BP: --  BP(mean): 78 (72 - 78)  RR: 18 (18 - 18)  SpO2: 97% (96% - 98%)    I&O's Detail  I & Os for 24h ending 2017 07:00  =============================================  IN:    Oral Fluid: 1400 ml    Solution: 100 ml    Total IN: 1500 ml  ---------------------------------------------  OUT:    Voided: 500 ml    Total OUT: 500 ml  ---------------------------------------------  Total NET: 1000 ml    I & Os for current day (as of 2017 17:01)  =============================================  IN:    Oral Fluid: 120 ml    Total IN: 120 ml  ---------------------------------------------  OUT:    Total OUT: 0 ml  ---------------------------------------------  Total NET: 120 ml      Daily     Daily Weight in k.6 (2017 08:00)    LABS:                        11.1   8.4   )-----------( 215      ( 2017 04:25 )             33.2     06-12    138  |  103  |  8   ----------------------------<  104<H>  4.0   |  24  |  0.87    Ca    9.0      2017 04:25      PT/INR - ( 2017 05:41 )   PT: 25.5 sec;   INR: 2.22 ratio           PHYSICAL EXAM:  Constitutional: well developed, well nourished, NAD  Eyes: anicteric  ENMT: normal facies, symmetric  Neck: supple  Respiratory: CTA bilaterally  Cardiovascular: RRR  Gastrointestinal: abdomen soft, nontender, nondistended. No obvious masses. No peritonitis  Extremities: FROM, warm  Neurological: intact, non-focal  Skin: no gross lesions  Lymph Nodes: no gross adenopathy  Musculoskeletal: equal strength bilateral upper and lower extremities  Psychiatric: oriented x 3; appropriate

## 2017-06-12 NOTE — PROGRESS NOTE ADULT - ASSESSMENT
72 y/o F s/p LVAD implantation 2015 at [facility] on Coumadin, recently admitted to [facility] for H[place] II diverticulitis (~3cm pericolonic abscess), blood cx negative as of 6/9

## 2017-06-12 NOTE — PROGRESS NOTE ADULT - PROBLEM SELECTOR PLAN 2
No changes to current settings.   INR at goal. Continue current dose of warfarin and current dose of aspirin.  MAP at goal in the 70-80 range.

## 2017-06-12 NOTE — PROGRESS NOTE ADULT - SUBJECTIVE AND OBJECTIVE BOX
Interval History:    Less abdominal pain, still with diarrhea. No PND/orthopena/LE edema    Review Of Systems:  See above, otherwise, complete 10 point review of systems negative.      Medications:  digoxin     Tablet 0.125milliGRAM(s) Oral daily  atorvastatin 80milliGRAM(s) Oral at bedtime  metoprolol succinate ER 25milliGRAM(s) Oral daily  aspirin  chewable 81milliGRAM(s) Oral daily  losartan 50milliGRAM(s) Oral daily  pantoprazole    Tablet 40milliGRAM(s) Oral before breakfast  piperacillin/tazobactam IVPB. 3.375Gram(s) IV Intermittent every 8 hours    Vitals:  T(C): 36.9, Max: 37.1 ( @ 17:00)  HR: 64 (64 - 78)  BP: --  BP(mean): 72 (72 - 78)  ABP: --  ABP(mean): --  RR: 18 (18 - 18)  SpO2: 98% (93% - 98%)  Wt(kg): --    Daily     Daily Weight in k.6 (2017 08:00)    I&O's Summary    I & Os for current day (as of 2017 10:58)  =============================================  IN: 1500 ml / OUT: 500 ml / NET: 1000 ml      Physical Exam:  Appearance: No Acute Distress  HEENT:  No JVD	  Cardiovascular: + LVAD hum  Respiratory: Clear to auscultation bilaterally  Gastrointestinal:  Soft, Non-tender around driveline site	  Skin: No cyanosis	  Neurologic: Non-focal  Extremities: No clubbing, cyanosis or edema  Psychiatry: A & O x 3, Mood & affect appropriate    LVAD Interrogation:  Pump Flow: 3.7  Pump Speed: 8800 RPM  Pulse Index: 3.8  Pump Power: 5  VAD Events:   Driveline evaluation:    Programming Changes: [ ] No changes made [ ] Changes made:    Labs:                        11.1   8.4   )-----------( 215      ( 2017 04:25 )             33.2     06-12    138  |  103  |  8   ----------------------------<  104<H>  4.0   |  24  |  0.87    Ca    9.0      2017 04:25      PT/INR - ( 2017 05:41 )   PT: 25.5 sec;   INR: 2.22 ratio           TELEMETRY:  NSR w/ 5 and 7 beats  ECG:      [ ] Echocardiogram:    -----------------------------------------------------------------  [ ] Congestive Heart Failure                  [ ] Acute                                                [ ] Acute on Chronic     [ ] Chronic  [ ] Diastolic (HFpEF)  [ ] Systolic (HFrEF)  [ ] Combined Systolic & Diastolic      [ ] ACC/AHA Stage: _____     [ ] NYHA Class: _____ Interval History:    Less abdominal pain, still with diarrhea. No PND/orthopena/LE edema    Review Of Systems:  See above, otherwise, complete 10 point review of systems negative.      Medications:  digoxin     Tablet 0.125milliGRAM(s) Oral daily  atorvastatin 80milliGRAM(s) Oral at bedtime  metoprolol succinate ER 25milliGRAM(s) Oral daily  aspirin  chewable 81milliGRAM(s) Oral daily  losartan 50milliGRAM(s) Oral daily  pantoprazole    Tablet 40milliGRAM(s) Oral before breakfast  piperacillin/tazobactam IVPB. 3.375Gram(s) IV Intermittent every 8 hours    Vitals:  T(C): 36.9, Max: 37.1 ( @ 17:00)  HR: 64 (64 - 78)  BP: --  BP(mean): 72 (72 - 78)  ABP: --  ABP(mean): --  RR: 18 (18 - 18)  SpO2: 98% (93% - 98%)  Wt(kg): --    Daily     Daily Weight in k.6 (2017 08:00)    I&O's Summary    I & Os for current day (as of 2017 10:58)  =============================================  IN: 1500 ml / OUT: 500 ml / NET: 1000 ml      Physical Exam:  Appearance: No Acute Distress  HEENT:  Mild elevation in JVP	  Cardiovascular: + LVAD hum  Respiratory: Clear to auscultation bilaterally  Gastrointestinal:  Soft, Non-tender around driveline site	  Skin: No cyanosis	  Neurologic: Non-focal  Extremities: No clubbing, cyanosis or edema  Psychiatry: A & O x 3, Mood & affect appropriate    LVAD Interrogation:  Pump Flow: 3.7  Pump Speed: 8800 RPM  Pulse Index: 3.8  Pump Power: 5  VAD Events: No alarms.  Driveline evaluation:  No drainage or tenderness.   Programming Changes: No changes made    Labs:                        11.1   8.4   )-----------( 215      ( 2017 04:25 )             33.2     06-12    138  |  103  |  8   ----------------------------<  104<H>  4.0   |  24  |  0.87    Ca    9.0      2017 04:25      PT/INR - ( 2017 05:41 )   PT: 25.5 sec;   INR: 2.22 ratio        TELEMETRY:  NSR w/ 5 and 7 beats  ECG:

## 2017-06-12 NOTE — CONSULT NOTE ADULT - SUBJECTIVE AND OBJECTIVE BOX
ID CONSULTATION--Joshua Helms MD  Pager [phone]    The patient is a 71y old  Female who presents with a chief complaint of abdominal cramping pain (10 Jim 2017 00:05).  -She was diagnosed with diverticulitis with a small abscess.    HPI:  70 y/o female h/o LVAD implanted 2015 @ [facility] presents to ED c/o LLQ pain, nausea and decreased appetite x 3days. No fevers, no chills, no diarrhea, no constipation, no change in bowel habits.   Her last colonoscopy was reportedly normal in 1/2017. (09 Jun 2017 18:58).  The patient reported to me today that while she had significant LLQ pain upon admission, she is markedly improved with regard to pain now.  Of note, a CT of the abd revealed an abscess that was about 3 cm--not initially felt to be amenable to IR drainage--but a repeat CT is pending to see if this is anatomically different.      PAST MEDICAL & SURGICAL HISTORY:  Diverticulitis  CHF (congestive heart failure)  Myocardial infarction  GERD (gastroesophageal reflux disease)  Polymyalgia rheumatica--on steroids  Fibromyalgia  Acid reflux  LVAD (left ventricular assist device) present  No significant past surgical history  No significant past surgical history      SOCIAL: no active tobacco    FAMILY HISTORY:  Non-contributory to the current case    REVIEW OF SYSTEMS  General:	Denies any malaise fatigue or chills. Fevers absent  Skin:No rash	  Ophthalmologic:Denies any visual complaints,discharge redness or photophobia  Respiratory and Thorax:No cough,sputum or chest pain.Denies shortness of breath	  Cardiovascular:	No chest pain,palpitaions or dizziness  Gastrointestinal:	the LLQ pain improved  Genitourinary:	No dysuria,frequency. No flank pain  Musculoskeletal:	No joint swelling or pain.No weakness  Neurological:No confusion,diziness.No extremity weakness.No bladder or bowel incontinence	    Hematology/Lymphatics:	no LAD  Allergies    No Known Allergies    Intolerances-none known    ANTIMICROBIALS:    piperacillin/tazobactam IVPB. 3.375Gram(s) IV Intermittent every 8 hours    Vital Signs Last 24 Hrs  T(C): 36.9, Max: 37.1 (06-11 @ 17:00)  T(F): 98.4, Max: 98.8 (06-11 @ 17:00)  HR: 64 (64 - 78)  BP: --  BP(mean): 72 (72 - 78)  RR: 18 (18 - 18)  SpO2: 98% (93% - 98%)    PHYSICAL EXAM:Pleasant patient in no acute distress.--LVAD in place  Constitutional:Comfortable.Awake and alert  Neck:Supple,No LN,no JVD  Respiratory:Good air entry bilaterally,CTA  Cardiovascular:LVAD mech sounds noted  Gastrointestinal:Soft BS(+) no tenderness no masses ,No rebound or guarding--no sig pain at LLQ --even with deeper palpation  Genitourinary:No CVA tendereness   Rectal:--deferred  Extremities:No cyanosis,clubbing or edema.  Neurological:AAO X 3,No grossly focal deficits  Skin:No rash   Musculoskeletal:No joint swelling or LOM  Psychiatric:Affect normal.    DATA:                    11.1   8.4   )-----------( 215      ( 12 Jun 2017 04:25 )             33.2       06-12    138  |  103  |  8   ----------------------------<  104<H>  4.0   |  24  |  0.87    Ca    9.0      12 Jun 2017 04:25      RECENT CULTURES: no positive---only UC available and negative  06-09 @ 19:10 .Blood Blood      BC neg to date      RADIOLOGY: CT a/p diverticulitis with 3 cm abscess
CC: Patient is a 71y old  Female who presents with a chief complaint of Abd pain (09 Jun 2017 18:58)    HPI   This patient is a very pleasant 71 year old female w/ hx of LVAD implanted 2015 @ [facility] presents to ED w/ left lower sharp abdominal pain, associated with nausea and decreased appetite x 3days. No fevers, no chills, no diarrhea, no constipation, no change in bowel habits.   Her last colonoscopy was reportedly normal in 1/2017.    REVIEW OF SYSTEMS: Negative, unless mentioned above in HPI  	    PAST MEDICAL & SURGICAL HISTORY:  Diverticulitis  CHF (congestive heart failure)  Myocardial infarction  GERD (gastroesophageal reflux disease)  Polymyalgia rheumatica  Fibromyalgia  Acid reflux  LVAD (left ventricular assist device) present    MEDS  MEDICATIONS  (STANDING):  digoxin     Tablet 0.125milliGRAM(s) Oral daily  atorvastatin 80milliGRAM(s) Oral at bedtime  metoprolol succinate ER 25milliGRAM(s) Oral daily  warfarin 2milliGRAM(s) Oral once  ciprofloxacin   IVPB 400milliGRAM(s) IV Intermittent every 12 hours  aspirin  chewable 81milliGRAM(s) Oral daily  metroNIDAZOLE  IVPB 500milliGRAM(s) IV Intermittent every 8 hours  potassium chloride    Tablet ER 20milliEquivalent(s) Oral once    MEDICATIONS  (PRN):    Allergies  No Known Allergies    Vital Signs    T(C): 37.8, Max: 37.8 (06-09 @ 18:24)  HR: 87 (80 - 87)  BP: 102/76 (89/37 - 102/76)  RR: 16 (16 - 18)  SpO2: 97% (97% - 100%)    Physical Exam:    A&O x 3 S1S2 CTA b/l Abd soft, mild tenderness to LLQ, +BS,+BM, voiding Ext neg c/c/e	    Labs and Results:                  12.7  9.4   )-----------( 179                  37.9   138  |  96  |  13 ----------------------------<  101 3.3  |  25  |  1.05  PT: 27.4    INR: 2.49 PTT:39.9   UA - equivocal, moderate leukocyte esterase, negative nitrite  Imaging:   CT Abdomen / Pelvis:  Sigmoid diverticulitis w/ ~3.4cm abscess
Chief Complaint: Abdominal Pain    HPI:  70 y/o female h/o LVAD implanted 2015 @ [facility] presented initially to ED c/o LLQ pain and decreased appetite x 3days. Patient states she has a known history of diverticulitis in the past, as recent as three months ago. On the day prior to admission, she experienced acute onset of LLQ abdominal pain, described as sharp, and radiating to her right side. It was not associated with nausea or change in bowel habits at that time. She did not experience fever. It was worsening so she came to the ER.      PAST MEDICAL & SURGICAL HISTORY:  Diverticulitis  CHF (congestive heart failure)  Myocardial infarction  GERD (gastroesophageal reflux disease)  Polymyalgia rheumatica  Fibromyalgia  Acid reflux  LVAD (left ventricular assist device) present  Mitral Valve annuloplasty      Review of Systems:   CONSTITUTIONAL: No fever.  EYES: No eye pain or discharge.  ENMT:  No sinus or throat pain  NECK: No pain or stiffness  RESPIRATORY: No cough, wheezing, chills or hemoptysis; No shortness of breath  CARDIOVASCULAR: No chest pain, palpitations, dizziness, or leg swelling  GASTROINTESTINAL: See HPI. No melena or hematochezia.  GENITOURINARY: No dysuria or incontinence  NEUROLOGICAL: No headaches, memory loss, loss of strength, numbness, or tremors  SKIN: No rashes.  LYMPH NODES: No enlarged glands  ENDOCRINE: + cold intolerance; No hair loss  MUSCULOSKELETAL: No joint pain or swelling; No muscle, back, or extremity pain  PSYCHIATRIC: No depression, anxiety, mood swings, or difficulty sleeping  HEME/LYMPH: No easy bruising, or bleeding gums  ALLERY AND IMMUNOLOGIC: No hives or eczema    Allergies    No Known Allergies    Intolerances    Denies      Social History: Former smoker, last cigarette 5 years ago. Retired nurse at [employer].    FAMILY HISTORY:  + Family history of CAD, DM, HTN. Mother had a pacemaker.      HOME MEDICATIONS (PER PATIENT):  ASA 81 mg PO daily  Colace BID  Warfarin 5 mg PO daily  Iron tabs BID  Lasix 40 mg PO daily  Losartan 50 mg PO daily  Metoprolol XL 25 mg PO daily  Multivitamin  Protonix 40 mg PO daily  Prednisone 5 mg PO daily    MEDICATIONS  (STANDING):  digoxin     Tablet 0.125milliGRAM(s) Oral daily  atorvastatin 80milliGRAM(s) Oral at bedtime  metoprolol succinate ER 25milliGRAM(s) Oral daily  aspirin  chewable 81milliGRAM(s) Oral daily  losartan 50milliGRAM(s) Oral daily  pantoprazole    Tablet 40milliGRAM(s) Oral before breakfast  piperacillin/tazobactam IVPB. 3.375Gram(s) IV Intermittent every 8 hours    MEDICATIONS  (PRN):      Vital Signs Last 24 Hrs  T(C): 36.9, Max: 37.1 (06-11 @ 17:00)  HR: 64 (64 - 78)  BP: --  RR: 18 (18 - 18)  SpO2: 98% (93% - 98%)  Wt(kg): --  CAPILLARY BLOOD GLUCOSE    I&O's Summary    I & Os for current day (as of 12 Jun 2017 11:27)  =============================================  IN: 1500 ml / OUT: 500 ml / NET: 1000 ml      PHYSICAL EXAM:  GENERAL: NAD, well-developed  HEAD:  Atraumatic, Normocephalic  EYES: EOMI, PERRLA, conjunctiva and sclera clear  NECK: Supple, No JVD  CHEST/LUNG: Clear to auscultation bilaterally; No wheeze  HEART: Regular rate and rhythm; No murmurs, rubs, or gallops  ABDOMEN: Soft, Nontender, Nondistended; Bowel sounds present  EXTREMITIES:  2+ Peripheral Pulses, No clubbing, cyanosis, or edema  PSYCH: AAOx3  NEUROLOGY: non-focal  SKIN: No rashes or lesions    LABS:                        11.1   8.4   )-----------( 215      ( 12 Jun 2017 04:25 )             33.2     06-12    138  |  103  |  8   ----------------------------<  104<H>  4.0   |  24  |  0.87    Ca    9.0      12 Jun 2017 04:25      PT/INR - ( 12 Jun 2017 05:41 )   PT: 25.5 sec;   INR: 2.22 ratio                   RADIOLOGY & ADDITIONAL TESTS:    Imaging Personally Reviewed:    CT Abdomen Pelvis: Sigmoid diverticulitis with adjacent 3.4 cm abscess.  Left adnexa is inseparable from this process.    EKG: None performed    Consultant(s) Notes Reviewed:  CT surgery, CHF team, Cardiology    Care Discussed with Consultants/Other Providers: Dr. Gleason (Surgery)
VANESSA RAMOS  [id number]    Date of Consult:  6/9/17  CHIEF COMPLAINT:  diverticulitis  HISTORY OF PRESENT ILLNESS:  71F with CHF s/p LVAD 2015 at [facility] with Dr Chavarria, PMR on chronic steroids, p/w abd pain. Pt found to have sigmoid diverticulitis with abscess on CT a/p. pt denies CP, SOB, dizziness, lightheadedness, syncope, LE edema. Abd soft, non tender. LVAD insertion site clean, blood/pus not appreciated    Allergies    No Known Allergies    Intolerances    	    MEDICATIONS:  digoxin     Tablet 0.125milliGRAM(s) Oral daily  metoprolol succinate ER 25milliGRAM(s) Oral daily  warfarin 2milliGRAM(s) Oral once  aspirin  chewable 81milliGRAM(s) Oral daily  losartan 50milliGRAM(s) Oral daily    ciprofloxacin   IVPB 400milliGRAM(s) IV Intermittent every 12 hours  metroNIDAZOLE  IVPB 500milliGRAM(s) IV Intermittent every 8 hours        docusate sodium 100milliGRAM(s) Oral three times a day    atorvastatin 80milliGRAM(s) Oral at bedtime    potassium chloride    Tablet ER 20milliEquivalent(s) Oral once      PAST MEDICAL & SURGICAL HISTORY:  Diverticulitis  CHF (congestive heart failure)  Myocardial infarction  GERD (gastroesophageal reflux disease)  Polymyalgia rheumatica  Fibromyalgia  Acid reflux  LVAD (left ventricular assist device) present  No significant past surgical history  No significant past surgical history      FAMILY HISTORY:  No pertinent family history in first degree relatives  No pertinent family history in first degree relatives      SOCIAL HISTORY:      former Smoker, quit 4 yrs ago, 20 pk yr hx  denies Alcohol/drugs  lives at home      REVIEW OF SYSTEMS:  See HPI. Otherwise, 10 point ROS done and otherwise negative.    PHYSICAL EXAM:  T(C): 37.8, Max: 37.8 (06-09 @ 18:24)  HR: 87 (80 - 87)  BP: 102/76 (89/37 - 102/76)  RR: 16 (16 - 18)  SpO2: 97% (97% - 100%)  Wt(kg): --  I&O's Summary      Appearance: Normal	  HEENT:   Normal oral mucosa, PERRL, EOMI	  Lymphatic: No lymphadenopathy  Cardiovascular: +continuous hum, No JVD, No edema b/l LE  Respiratory: Lungs clear to auscultation	  Psychiatry: A & O x 3, Mood & affect appropriate  Gastrointestinal:  Soft, Non-tender, + BS. LVAD insertion site clean, pus not appreciated  Skin: No rashes, No ecchymoses, No cyanosis	  Neurologic: Non-focal  Extremities: Normal range of motion, No clubbing, cyanosis or edema          LABS:	 	    CBC Full  -  ( 09 Jun 2017 12:08 )  WBC Count : 9.4 K/uL  Hemoglobin : 12.7 g/dL  Hematocrit : 37.9 %  Platelet Count - Automated : 179 K/uL  Mean Cell Volume : 94.0 fl  Mean Cell Hemoglobin : 31.4 pg  Mean Cell Hemoglobin Concentration : 33.4 gm/dL  Auto Neutrophil # : 6.5 K/uL  Auto Lymphocyte # : 1.6 K/uL  Auto Monocyte # : 1.2 K/uL  Auto Eosinophil # : 0.0 K/uL  Auto Basophil # : 0.0 K/uL  Auto Neutrophil % : 69.2 %  Auto Lymphocyte % : 16.9 %  Auto Monocyte % : 13.3 %  Auto Eosinophil % : 0.4 %  Auto Basophil % : 0.2 %    06-09    138  |  96  |  13  ----------------------------<  101<H>  3.3<L>   |  25  |  1.05    Ca    9.6      09 Jun 2017 12:08    TPro  8.1  /  Alb  4.0  /  TBili  0.6  /  DBili  x   /  AST  18  /  ALT  11  /  AlkPhos  75  06-09      proBNP:   Lipid Profile:   HgA1c:   TSH:       CARDIAC MARKERS:            TELEMETRY: 	    ECG:  	  RADIOLOGY:CT a/p: FINDINGS:    LOWER CHEST: Minimal dependent atelectasis. Partially image LVAD noted.    Evaluation of the abdominal viscera is limited due to lack of IV contrast   and limited evaluation of the upper abdomen secondary from streak   artifact from the LVAD device.    LIVER: Within normal limits.  BILE DUCTS: Normal caliber.  GALLBLADDER: Contracted.  SPLEEN: Within normal limits.  PANCREAS: Within normal limits.  ADRENALS: Within normal limits.  KIDNEYS/URETERS: Within normal limits.    BLADDER: Within normal limits.  REPRODUCTIVE ORGANS: The uterus and right adnexa are within normal   limits. Left adnexa is obscured by adjacent sigmoid colonic inflammation   and fluid collection.    BOWEL: No bowel obstruction. Sigmoid diverticula with mild pericolic   inflammation. Previously noted giant diverticulum is unchanged in size   and completely filled with fluid. There is a new adjacent fluid   collection measuring 3.4 x 3.3 cm. Appendix is normal.  PERITONEUM: No ascites. No free intraperitoneal air.  VESSELS: Within normal limits.  RETROPERITONEUM: No lymphadenopathy.    ABDOMINAL WALL: Small fat-containing umbilical hernia.  BONES: Mild multilevel degenerative spinal changes. Mild anterior   superior endplate compression deformity of L1-L3 involving less than 25%   of the vertebral body, unchanged from 3/12/2017.    IMPRESSION: Sigmoid diverticulitis with adjacent 3.4 cm abscess.  Left adnexa is inseparable from this process.  OTHER: 	    PREVIOUS DIAGNOSTIC TESTING:    [ ] Echocardiogram:  [ ]  Catheterization:  [ ] Stress Test:  	  	  ASSESSMENT/PLAN: 	    71F with LVAD now p/w sigmoid diverticulitis c/b abscess  1.	Sigmoid diverticulits  -cont abx  -recommend ID consult  -ct a/p without finding of LVAD involvement of abscess at this time, cont to trend WBC, q 4 vitals  -check fobt  2. LVAD  -cont coumadin  Cont current home meds of Toprol and Lasix and losartan  -connect to LVAD consol when available  -tele monitoring  -will follow with you    Joaquin Maki MD  [id number]

## 2017-06-12 NOTE — CONSULT NOTE ADULT - PROBLEM SELECTOR RECOMMENDATION 3
1. OK to hold prednisone in setting of acute infection.  2. If becomes septic, would reintroduce at higher dose or consider stress dose steroids pending the clinical picture.

## 2017-06-12 NOTE — PROGRESS NOTE ADULT - PROBLEM SELECTOR PLAN 2
Repeat CT abd/pelvis with IV contrast today. Patient is NPO for test  As per Trauma Surgery, abx duration will be dependent on latest CT scan findings and recommendation from ID  ID consulted

## 2017-06-13 DIAGNOSIS — R19.7 DIARRHEA, UNSPECIFIED: ICD-10-CM

## 2017-06-13 LAB
ANION GAP SERPL CALC-SCNC: 16 MMOL/L — SIGNIFICANT CHANGE UP (ref 5–17)
BUN SERPL-MCNC: 8 MG/DL — SIGNIFICANT CHANGE UP (ref 7–23)
CALCIUM SERPL-MCNC: 9.2 MG/DL — SIGNIFICANT CHANGE UP (ref 8.4–10.5)
CHLORIDE SERPL-SCNC: 102 MMOL/L — SIGNIFICANT CHANGE UP (ref 96–108)
CO2 SERPL-SCNC: 22 MMOL/L — SIGNIFICANT CHANGE UP (ref 22–31)
CREAT SERPL-MCNC: 0.89 MG/DL — SIGNIFICANT CHANGE UP (ref 0.5–1.3)
GLUCOSE SERPL-MCNC: 93 MG/DL — SIGNIFICANT CHANGE UP (ref 70–99)
HCT VFR BLD CALC: 37 % — SIGNIFICANT CHANGE UP (ref 34.5–45)
HGB BLD-MCNC: 12 G/DL — SIGNIFICANT CHANGE UP (ref 11.5–15.5)
INR BLD: 2.14 RATIO — HIGH (ref 0.88–1.16)
MCHC RBC-ENTMCNC: 30.4 PG — SIGNIFICANT CHANGE UP (ref 27–34)
MCHC RBC-ENTMCNC: 32.3 GM/DL — SIGNIFICANT CHANGE UP (ref 32–36)
MCV RBC AUTO: 94 FL — SIGNIFICANT CHANGE UP (ref 80–100)
PLATELET # BLD AUTO: 237 K/UL — SIGNIFICANT CHANGE UP (ref 150–400)
POTASSIUM SERPL-MCNC: 4.3 MMOL/L — SIGNIFICANT CHANGE UP (ref 3.5–5.3)
POTASSIUM SERPL-SCNC: 4.3 MMOL/L — SIGNIFICANT CHANGE UP (ref 3.5–5.3)
PROTHROM AB SERPL-ACNC: 23.7 SEC — HIGH (ref 9.8–12.7)
RBC # BLD: 3.94 M/UL — SIGNIFICANT CHANGE UP (ref 3.8–5.2)
RBC # FLD: 12.2 % — SIGNIFICANT CHANGE UP (ref 10.3–14.5)
SODIUM SERPL-SCNC: 140 MMOL/L — SIGNIFICANT CHANGE UP (ref 135–145)
WBC # BLD: 4.9 K/UL — SIGNIFICANT CHANGE UP (ref 3.8–10.5)
WBC # FLD AUTO: 4.9 K/UL — SIGNIFICANT CHANGE UP (ref 3.8–10.5)

## 2017-06-13 PROCEDURE — 99233 SBSQ HOSP IP/OBS HIGH 50: CPT | Mod: 25

## 2017-06-13 PROCEDURE — 99232 SBSQ HOSP IP/OBS MODERATE 35: CPT

## 2017-06-13 PROCEDURE — 99231 SBSQ HOSP IP/OBS SF/LOW 25: CPT

## 2017-06-13 PROCEDURE — 76937 US GUIDE VASCULAR ACCESS: CPT | Mod: 26

## 2017-06-13 PROCEDURE — 36569 INSJ PICC 5 YR+ W/O IMAGING: CPT

## 2017-06-13 PROCEDURE — 93750 INTERROGATION VAD IN PERSON: CPT

## 2017-06-13 PROCEDURE — 99233 SBSQ HOSP IP/OBS HIGH 50: CPT

## 2017-06-13 PROCEDURE — 77001 FLUOROGUIDE FOR VEIN DEVICE: CPT | Mod: 26

## 2017-06-13 RX ORDER — ERTAPENEM SODIUM 1 G/1
1 INJECTION, POWDER, LYOPHILIZED, FOR SOLUTION INTRAMUSCULAR; INTRAVENOUS
Qty: 14 | Refills: 0 | OUTPATIENT
Start: 2017-06-13 | End: 2017-06-27

## 2017-06-13 RX ORDER — WARFARIN SODIUM 2.5 MG/1
2.5 TABLET ORAL ONCE
Qty: 0 | Refills: 0 | Status: COMPLETED | OUTPATIENT
Start: 2017-06-13 | End: 2017-06-13

## 2017-06-13 RX ORDER — LOPERAMIDE HCL 2 MG
2 TABLET ORAL EVERY 4 HOURS
Qty: 0 | Refills: 0 | Status: DISCONTINUED | OUTPATIENT
Start: 2017-06-13 | End: 2017-06-14

## 2017-06-13 RX ADMIN — Medication 20 MILLIEQUIVALENT(S): at 13:06

## 2017-06-13 RX ADMIN — Medication 81 MILLIGRAM(S): at 13:06

## 2017-06-13 RX ADMIN — PANTOPRAZOLE SODIUM 40 MILLIGRAM(S): 20 TABLET, DELAYED RELEASE ORAL at 06:16

## 2017-06-13 RX ADMIN — LOSARTAN POTASSIUM 50 MILLIGRAM(S): 100 TABLET, FILM COATED ORAL at 06:16

## 2017-06-13 RX ADMIN — Medication 25 MILLIGRAM(S): at 06:17

## 2017-06-13 RX ADMIN — ERTAPENEM SODIUM 120 MILLIGRAM(S): 1 INJECTION, POWDER, LYOPHILIZED, FOR SOLUTION INTRAMUSCULAR; INTRAVENOUS at 13:05

## 2017-06-13 RX ADMIN — ATORVASTATIN CALCIUM 80 MILLIGRAM(S): 80 TABLET, FILM COATED ORAL at 23:13

## 2017-06-13 RX ADMIN — Medication 0.12 MILLIGRAM(S): at 06:17

## 2017-06-13 RX ADMIN — WARFARIN SODIUM 2.5 MILLIGRAM(S): 2.5 TABLET ORAL at 23:13

## 2017-06-13 RX ADMIN — Medication 40 MILLIGRAM(S): at 06:16

## 2017-06-13 NOTE — PROGRESS NOTE ADULT - ASSESSMENT
72 y/o F s/p LVAD implantation 2015 at [facility] on Coumadin, recently admitted to [facility] for Hinchey II diverticulitis (~3cm pericolonic abscess), blood cx negative as of 6/9 70 y/o F s/p LVAD implantation 2015 at [facility] on Coumadin, recently admitted to [facility] for Hinchey II diverticulitis (~3cm pericolonic abscess), blood cx negative as of 6/9 6/12/17 CTA Abd/Pelvis with IV Cont: Sigmoid diverticulitis with a pericolonic abscess/phlegmon not significantly changed given differences in technique from prior study 6/9/2017.

## 2017-06-13 NOTE — PROGRESS NOTE ADULT - SUBJECTIVE AND OBJECTIVE BOX
Pre- Procedure   Patient and/or family/surrogate educated about central line-associated blood stream infection prevention practices  Central Line insertion checklist utilized    Catheter Type: (  ) Dialysis  (  ) Introducer  (   ) Central venous catheter   ( x ) peripherally inserted central catheter (PICC)      Number of Lumens: ( x ) single   (  ) Double   (   ) Triple  (  ) Antimicrobial catheter    TIME OUT performed prior ti this procedure with verbal confirmation of correct patient identity, correct site, side amd jaylan (if applicable), agreement of the procedure, correct patient position, availability of necessary equipment.  The consent form (if applicable) is complete and accurate.  Safety precautions based on patient history or medication use has been addressed   RN at bedside    Procedure  The patient was placed in the (x  )Supine position, (   ) Trendelenburg postiton.  The patient's placement site was prepped with Chlorhexidine solution then drapped using maximum sterile barrier protection.  The area was injected with _8____ cc of 1% Lidocaine.  Using the  ( x ) Seldinger technique    (  ) Modified Seldinger technique   (  ) Ultrasound, the catheter was introduced.  Strict adherence to outlined aseptic technique, including hand washing prior to donning sterile barriers (gloves and gown), utilizing a mask and cap, plus draping the patient with a sterile drape was observed.  Uponcompletion of the line placement, the insertion site was covered with sterile dressing.    All materials used for catheter insertion, including the intact guide wire, were accounted for at the end of the procedure      Emergency placement ( x ) No    (   ) Yes   ( x  ) New Site    (   )  Guide Wire change      Attempted site and actual site (  x ) Right  (   )  Left               brachial vein        Post Procedure (Catheter Placement Verification)  Correct placement confirmed by pressure transducer or ultrasnography or venous saturation  The Tip of the catheter is confirmed to be in approptiate position by radiograpy which revealed no pneumothorax and line may be accessed

## 2017-06-13 NOTE — PROGRESS NOTE ADULT - SUBJECTIVE AND OBJECTIVE BOX
Subjective: Pt states "I'm still having watery diarrhea but no blood in stool" denies any CP, SOB, N/V and palpitations. No acute events overnight.     VITAL SIGNS    Telemetry: 60-70 SR   Vital Signs Last 24 Hrs  T(C): 36.4, Max: 36.7 ( @ 13:00)  T(F): 97.6, Max: 98.1 ( @ 13:00)  HR: 72 (67 - 72)  RR: 18 (16 - 18)  SpO2: 98% (97% - 98%) RA        I & Os for current day (as of  @ 10:24)  =============================================  IN: 410 ml / OUT: 0 ml / NET: 410 ml          Daily Weight in k.1 (2017 07:34)  Admit Wt: Drug Dosing Weight  Height (cm): 167.6 (2017 10:53)  Weight (kg): 78.5 (2017 10:53)  BMI (kg/m2): 27.9 (2017 10:53)  BSA (m2): 1.88 (2017 10:53)                          MEDICATIONS  digoxin     Tablet 0.125milliGRAM(s) Oral daily  atorvastatin 80milliGRAM(s) Oral at bedtime  metoprolol succinate ER 25milliGRAM(s) Oral daily  aspirin  chewable 81milliGRAM(s) Oral daily  losartan 50milliGRAM(s) Oral daily  pantoprazole    Tablet 40milliGRAM(s) Oral before breakfast  ertapenem  IVPB 1000milliGRAM(s) IV Intermittent every 24 hours  ertapenem  IVPB  IV Intermittent   furosemide    Tablet 40milliGRAM(s) Oral daily  potassium chloride    Tablet ER 20milliEquivalent(s) Oral daily        PHYSICAL EXAM    Neurology: A&Ox3, nonfocal, no gross deficits  CV : RRR+S1S2  Sternal Wound :  MSI CDI , Stable  Lungs: B/L BS  Abdomen: Soft, NT/ND, +BSx4Q, last BM on  (-)N/V/D  : Voiding without difficulty, bladder non-distended   Extremities: B/L LE edema, negative calf tenderness, +PP , SVG incision          LABS      140  |  102  |  8   ----------------------------<  93  4.3   |  22  |  0.89                                 12.0   4.9   )-----------( 237      ( 2017 05:18 )             37.0          PT/INR - ( 2017 05:18 )   PT: 23.7 sec;   INR: 2.14 ratio                CXR:    TTE:       PAST MEDICAL & SURGICAL HISTORY:  Diverticulitis  CHF (congestive heart failure)  Myocardial infarction  GERD (gastroesophageal reflux disease)  Polymyalgia rheumatica  Fibromyalgia  Acid reflux  LVAD (left ventricular assist device) present  No significant past surgical history  No significant past surgical history       Physical Therapy Rec:   Home  [  ]   Home w/ PT  [  ]  Rehab  [  ]    Discussed with CT Surgery attending. Subjective: Pt states "I'm still having watery diarrhea but no blood in stool" denies any CP, SOB, N/V and palpitations. No acute events overnight.     VITAL SIGNS    Telemetry: 60-70 SR   Vital Signs Last 24 Hrs  T(C): 36.4, Max: 36.7 ( @ 13:00)  T(F): 97.6, Max: 98.1 ( @ 13:00)  HR: 72 (67 - 72)  RR: 18 (16 - 18)  SpO2: 98% (97% - 98%) RA        I & Os for current day (as of  @ 10:24)  =============================================  IN: 410 ml / OUT: 0 ml / NET: 410 ml          Daily Weight in k.1 (2017 07:34)  Admit Wt: Drug Dosing Weight  Height (cm): 167.6 (2017 10:53)  Weight (kg): 78.5 (2017 10:53)  BMI (kg/m2): 27.9 (2017 10:53)  BSA (m2): 1.88 (2017 10:53)                          MEDICATIONS  digoxin     Tablet 0.125milliGRAM(s) Oral daily  atorvastatin 80milliGRAM(s) Oral at bedtime  metoprolol succinate ER 25milliGRAM(s) Oral daily  aspirin  chewable 81milliGRAM(s) Oral daily  losartan 50milliGRAM(s) Oral daily  pantoprazole    Tablet 40milliGRAM(s) Oral before breakfast  ertapenem  IVPB 1000milliGRAM(s) IV Intermittent every 24 hours  ertapenem  IVPB  IV Intermittent   furosemide    Tablet 40milliGRAM(s) Oral daily  potassium chloride    Tablet ER 20milliEquivalent(s) Oral daily        PHYSICAL EXAM    Neurology: A&Ox3, nonfocal, no gross deficits  CV : RRR+S1S2  Lungs: B/L BS CTA  Abdomen: Soft, NT/ND, +BSx4Q, last BM on     : Voiding without difficulty, bladder non-distended   Extremities: B/L LE negative edema, negative calf tenderness, +PP          LABS  06-13    140  |  102  |  8   ----------------------------<  93  4.3   |  22  |  0.89                                 12.0   4.9   )-----------( 237      ( 2017 05:18 )             37.0          PT/INR - ( 2017 05:18 )   PT: 23.7 sec;   INR: 2.14 ratio           PAST MEDICAL & SURGICAL HISTORY:  Diverticulitis  CHF (congestive heart failure)  Myocardial infarction  GERD (gastroesophageal reflux disease)  Polymyalgia rheumatica  Fibromyalgia  Acid reflux  LVAD (left ventricular assist device) present  No significant past surgical history         Physical Therapy Rec:   Home     Discussed with CT Surgery attending.

## 2017-06-13 NOTE — PROGRESS NOTE ADULT - SUBJECTIVE AND OBJECTIVE BOX
ACS DAILY PROGRESS NOTE:       SUBJECTIVE/ROS: Pt feels well, tolerated low residual diet- denies nausea/vomiting  [x] A ten-point review of systems was otherwise negative except as noted above.         PHYSICAL EXAM:  Constitutional: well developed, well nourished, NAD  Eyes: anicteric  ENMT: normal facies, symmetric  Neck: supple  Respiratory: CTA bilaterally  Cardiovascular: RRR  Gastrointestinal: abdomen soft, nontender, nondistended. No obvious masses. No peritonitis  Extremities: FROM, warm  Neurological: intact, non-focal  Skin: no gross lesions  Lymph Nodes: no gross adenopathy  Musculoskeletal: equal strength bilateral upper and lower extremities  Psychiatric: oriented x 3; appropriate ACS DAILY PROGRESS NOTE:       SUBJECTIVE/ROS: Pt feels well, tolerated low residual diet- denies nausea/vomiting but complains of diarrhea overnight.     [x] A ten-point review of systems was otherwise negative except as noted above.         PHYSICAL EXAM:  Constitutional: well developed, well nourished, NAD  Eyes: anicteric  ENMT: normal facies, symmetric  Neck: supple  Respiratory: CTA bilaterally  Cardiovascular: RRR  Gastrointestinal: abdomen soft, nontender, nondistended. No obvious masses. No peritonitis  Extremities: FROM, warm  Neurological: intact, non-focal  Skin: no gross lesions  Lymph Nodes: no gross adenopathy  Musculoskeletal: equal strength bilateral upper and lower extremities  Psychiatric: oriented x 3; appropriate      Vital Signs Last 24 Hrs  T(C): 36.6, Max: 36.7 (06-12 @ 13:00)  T(F): 97.8, Max: 98.1 (06-12 @ 13:00)  HR: 71 (64 - 72)  BP: --  BP(mean): 80 (72 - 80)  RR: 16 (16 - 18)  SpO2: 98% (97% - 98%)    I&O's Detail    I & Os for current day (as of 13 Jun 2017 07:10)  =============================================  IN:    Oral Fluid: 360 ml    Solution: 50 ml    Total IN: 410 ml  ---------------------------------------------  OUT:    Total OUT: 0 ml  ---------------------------------------------  Total NET: 410 ml                            12.0   4.9   )-----------( 237      ( 13 Jun 2017 05:18 )             37.0       06-13    140  |  102  |  8   ----------------------------<  93  4.3   |  22  |  0.89    Ca    9.2      13 Jun 2017 05:18 ACS DAILY PROGRESS NOTE:       SUBJECTIVE/ROS: Pt feels well, tolerated low residual diet- denies nausea/vomiting but complains of diarrhea overnight.       PHYSICAL EXAM:  Constitutional: well developed, well nourished, NAD  Eyes: anicteric  ENMT: normal facies, symmetric  Neck: supple  Respiratory: CTA bilaterally  Cardiovascular: RRR  Gastrointestinal: abdomen soft, nontender, nondistended. No obvious masses. No peritonitis  Extremities: FROM, warm  Neurological: intact, non-focal  Skin: no gross lesions  Lymph Nodes: no gross adenopathy  Musculoskeletal: equal strength bilateral upper and lower extremities  Psychiatric: oriented x 3; appropriate      Vital Signs Last 24 Hrs  T(C): 36.6, Max: 36.7 (06-12 @ 13:00)  T(F): 97.8, Max: 98.1 (06-12 @ 13:00)  HR: 71 (64 - 72)  BP: --  BP(mean): 80 (72 - 80)  RR: 16 (16 - 18)  SpO2: 98% (97% - 98%)    I&O's Detail    I & Os for current day (as of 13 Jun 2017 07:10)  =============================================  IN:    Oral Fluid: 360 ml    Solution: 50 ml    Total IN: 410 ml  ---------------------------------------------  OUT:    Total OUT: 0 ml  ---------------------------------------------  Total NET: 410 ml                            12.0   4.9   )-----------( 237      ( 13 Jun 2017 05:18 )             37.0       06-13    140  |  102  |  8   ----------------------------<  93  4.3   |  22  |  0.89    Ca    9.2      13 Jun 2017 05:18

## 2017-06-13 NOTE — PROGRESS NOTE ADULT - PROBLEM SELECTOR PLAN 3
1. OK to hold prednisone in setting of acute infection.  2. If becomes septic, would reintroduce at higher dose or consider stress dose steroids for hypotension.  3. Would resume on discharge.

## 2017-06-13 NOTE — PROGRESS NOTE ADULT - PROBLEM SELECTOR PLAN 1
CT A/P without interval enlargement of collection; no plan for IR drainage at present.  1. Plan for two week course of ertapenem per ID.  2. F/U pending BCx results.  3. PICC to be placed today by IR.  4. Serial abdominal exams.  5. Outpt GI/surgery f/u for screening colonoscopy after infection is treated.

## 2017-06-13 NOTE — PROGRESS NOTE ADULT - SUBJECTIVE AND OBJECTIVE BOX
Patient is a 71y old  Female who presents with a chief complaint of abdominal cramping pain (10 Jim 2017 00:05)      SUBJECTIVE / OVERNIGHT EVENTS: Ms. Thurman states that her pain is mostly resolved. She reports no nausea. Had 6-8 watery bowel movements since yesterday afternoon. Denies chest pain, SOB, fever/chills.    MEDICATIONS  (STANDING):  digoxin     Tablet 0.125milliGRAM(s) Oral daily  atorvastatin 80milliGRAM(s) Oral at bedtime  metoprolol succinate ER 25milliGRAM(s) Oral daily  aspirin  chewable 81milliGRAM(s) Oral daily  losartan 50milliGRAM(s) Oral daily  pantoprazole    Tablet 40milliGRAM(s) Oral before breakfast  ertapenem  IVPB 1000milliGRAM(s) IV Intermittent every 24 hours  ertapenem  IVPB  IV Intermittent   furosemide    Tablet 40milliGRAM(s) Oral daily  potassium chloride    Tablet ER 20milliEquivalent(s) Oral daily    MEDICATIONS  (PRN):      Vital Signs Last 24 Hrs  T(C): 36.4, Max: 36.7 (06-12 @ 13:00)  HR: 72 (67 - 72)  BP: --  RR: 18 (16 - 18)  SpO2: 98% (97% - 98%)  Wt(kg): --  CAPILLARY BLOOD GLUCOSE    I&O's Summary    I & Os for current day (as of 13 Jun 2017 11:25)  =============================================  IN: 410 ml / OUT: 0 ml / NET: 410 ml      PHYSICAL EXAM:  GENERAL: NAD, well-developed  HEAD:  Atraumatic, Normocephalic  EYES: Conjunctiva and sclera clear  CHEST/LUNG: Clear to auscultation bilaterally; No wheeze  HEART: Regular rate and rhythm; No murmurs, rubs, or gallops  ABDOMEN: Soft, Nontender, Nondistended; Bowel sounds present  EXTREMITIES:  2+ Peripheral Pulses, No clubbing, cyanosis, or edema  PSYCH: AAOx3  NEUROLOGY: non-focal  SKIN: No rashes or lesions.+ VAD in place, drivewire site is C/D/I.    LABS:                        12.0   4.9   )-----------( 237      ( 13 Jun 2017 05:18 )             37.0     06-13    140  |  102  |  8   ----------------------------<  93  4.3   |  22  |  0.89    Ca    9.2      13 Jun 2017 05:18      PT/INR - ( 13 Jun 2017 05:18 )   PT: 23.7 sec;   INR: 2.14 ratio         All blood cultures negative to date.      RADIOLOGY & ADDITIONAL TESTS:    Imaging Personally Reviewed:  CT A/P 6/12/17: Sigmoid diverticulitis with a pericolonic abscess/phlegmon not   significantly changed given differences in technique from prior study   6/9/2017.    Consultant(s) Notes Reviewed:  Surgery, Cardiology, ID    Care Discussed with Consultants/Other Providers: Surgery - Dr. Gleason

## 2017-06-13 NOTE — PROGRESS NOTE ADULT - ASSESSMENT
71 year old woman with ischemic cardiomyopathy leading to chronic systolic heart failure, s/p LVAD (2015) presenting with acute diverticulitis, now improving on IV antibiotics.

## 2017-06-13 NOTE — PROGRESS NOTE ADULT - PROBLEM SELECTOR PLAN 1
C/w Coumadin, will give 2.5mg tonight for INR 2.2  C/w current medications C/w Coumadin, Goal INR 2-3  C/w current medications  Dispo: DC planning home with VNS for IV abx tomorrow.

## 2017-06-13 NOTE — PROGRESS NOTE ADULT - PROBLEM SELECTOR PLAN 2
Clinically euvolemic.  1. PO lasix added back to regimen (home maintenance med).  2. Monitor BMP while on lasix.  3. Continue coumadin anticoagulation in setting of VAD.  4. Continue toprol XL, ARB, and digoxin.  5. Monitor Is and Os, daily weights.  6. VAD care per cardiology team.

## 2017-06-13 NOTE — PROGRESS NOTE ADULT - SUBJECTIVE AND OBJECTIVE BOX
Interventional Radiology Pre-Procedure Note    This is a 71y female with h/o CHF on LVAD who was admitted with diverticulitis on 6/9/17. CT on 6/9 shows sigmoid diverticulitis with pericolic abscess. Repeat imaging on 6/13 shows no significant change.  She was referred to IR for possible PICC line placement for continued abx therapy. She is currently on ertapenem 1000mg last dose 1305 today. Pt is also on warfarin 2.5mg last dose 2208 yesterday, INR 2.14 today. Pt offers no complaints at this time. Plan is for PICC line placement.      PAST MEDICAL & SURGICAL HISTORY:  Diverticulitis  CHF (congestive heart failure)  Myocardial infarction  GERD (gastroesophageal reflux disease)  Polymyalgia rheumatica  Fibromyalgia  Acid reflux  LVAD (left ventricular assist device) present       Vital Signs Last 24 Hrs  T(C): 36.8, Max: 36.8 (06-13 @ 13:57)  T(F): 98.2, Max: 98.2 (06-13 @ 13:57)  HR: 72 (67 - 72)  BP: --  BP(mean): 82 (78 - 82)  RR: 18 (16 - 18)  SpO2: 98% (97% - 98%)    Allergies: No Known Allergies    Physical Exam: Gen: NAD, A&O x3    Labs:                         12.0   4.9   )-----------( 237      ( 13 Jun 2017 05:18 )             37.0     06-13    140  |  102  |  8   ----------------------------<  93  4.3   |  22  |  0.89    Ca    9.2      13 Jun 2017 05:18      PT/INR - ( 13 Jun 2017 05:18 )   PT: 23.7 sec;   INR: 2.14 ratio             Informed consent obtained. All questions and concerns have been addressed at this time.     Clarice Romano PA-C  Spectra #[phone]  IR #[id number]

## 2017-06-13 NOTE — PROGRESS NOTE ADULT - ASSESSMENT
The patient has had resolution of her abd pain on medical management that includes abx.  Abx changed yesterday to once daily invanz.      Rx:  1. Repeat CT scan in 3 weeks  2. Continue the invanz thru the repeat CT scan--so let's make the tentative stop date July 9th for now....ultimate duration of abx to be determined by clinical course and f/u imaging.    3.  Surgical team f/u    Of note the patient is having diarrhea and if it persists through tomorrow I suggest check stool for c. diff.

## 2017-06-13 NOTE — PROGRESS NOTE ADULT - SUBJECTIVE AND OBJECTIVE BOX
Interval History:    Doing better without abdominal pain. Diarrhea improving. No shortness of breath or dizziness. PICC line placed.       Medications:  digoxin     Tablet 0.125milliGRAM(s) Oral daily  atorvastatin 80milliGRAM(s) Oral at bedtime  metoprolol succinate ER 25milliGRAM(s) Oral daily  aspirin  chewable 81milliGRAM(s) Oral daily  losartan 50milliGRAM(s) Oral daily  pantoprazole    Tablet 40milliGRAM(s) Oral before breakfast  piperacillin/tazobactam IVPB. 3.375Gram(s) IV Intermittent every 8 hours  MEDICATIONS  (STANDING):  digoxin     Tablet 0.125milliGRAM(s) Oral daily  atorvastatin 80milliGRAM(s) Oral at bedtime  metoprolol succinate ER 25milliGRAM(s) Oral daily  aspirin  chewable 81milliGRAM(s) Oral daily  losartan 50milliGRAM(s) Oral daily  pantoprazole    Tablet 40milliGRAM(s) Oral before breakfast  ertapenem  IVPB 1000milliGRAM(s) IV Intermittent every 24 hours  ertapenem  IVPB  IV Intermittent   furosemide    Tablet 40milliGRAM(s) Oral daily  potassium chloride    Tablet ER 20milliEquivalent(s) Oral daily  warfarin 2.5milliGRAM(s) Oral once    MEDICATIONS  (PRN):  loperamide 2milliGRAM(s) Oral every 4 hours PRN Diarrhea    Vital Signs Last 24 Hrs  T(C): 36.8, Max: 36.8 (06-13 @ 13:57)  T(F): 98.2, Max: 98.2 (06-13 @ 13:57)  HR: 72 (70 - 72)  BP: --  BP(mean): 82 (78 - 82)  RR: 18 (16 - 18)  SpO2: 98% (97% - 98%)      Physical Exam:  Appearance: No Acute Distress  HEENT:  Mild elevation in JVP	  Cardiovascular: + LVAD hum  Respiratory: Clear to auscultation bilaterally  Gastrointestinal:  Soft, Non-tender around driveline site	  Skin: No cyanosis	  Neurologic: Non-focal  Extremities: No clubbing, cyanosis or edema  Psychiatry: A & O x 3, Mood & affect appropriate    LVAD Interrogation: HM2  Pump Flow: ---  Pump Speed: 8800 RPM  Pulse Index: 5.0  Pump Power: 5.9  VAD Events: No alarms.  Driveline evaluation:  No drainage or tenderness.   Programming Changes: No changes made    Labs:    PT/INR - ( 13 Jun 2017 05:18 )   PT: 23.7 sec;   INR: 2.14 ratio                            12.0   4.9   )-----------( 237      ( 13 Jun 2017 05:18 )             37.0   06-13    140  |  102  |  8   ----------------------------<  93  4.3   |  22  |  0.89    Ca    9.2      13 Jun 2017 05:18

## 2017-06-13 NOTE — PROGRESS NOTE ADULT - SUBJECTIVE AND OBJECTIVE BOX
INFECTIOUS DISEASES FOLLOW UP--Joshua Helms MD  Pager [phone]    This is a follow up note for this  71yFemale with  Diverticulitis of large intestine and with a roughly 3 cm abscess.  The repeat CT yesterday revealed similar findings.  There is no IR plan for drainage.  For PICC placement.      ROS:  CONSTITUTIONAL:  No fever, good appetite  CARDIOVASCULAR:  No chest pain or palpitations  RESPIRATORY:  No dyspnea  GASTROINTESTINAL:  No nausea, vomiting, diarrhea, or abdominal pain  GENITOURINARY:  No dysuria  NEUROLOGIC:  No headache,     Allergies    No Known Allergies      ANTIBIOTICS/RELEVANT:  antimicrobials  ertapenem  IVPB 1000milliGRAM(s) IV Intermittent every 24 hours  ertapenem  IVPB  IV Intermittent     OTHER:  digoxin     Tablet 0.125milliGRAM(s) Oral daily  atorvastatin 80milliGRAM(s) Oral at bedtime  metoprolol succinate ER 25milliGRAM(s) Oral daily  aspirin  chewable 81milliGRAM(s) Oral daily  losartan 50milliGRAM(s) Oral daily  pantoprazole    Tablet 40milliGRAM(s) Oral before breakfast  furosemide    Tablet 40milliGRAM(s) Oral daily  potassium chloride    Tablet ER 20milliEquivalent(s) Oral daily  warfarin 2.5milliGRAM(s) Oral once  loperamide 2milliGRAM(s) Oral every 4 hours PRN      Objective:  Vital Signs Last 24 Hrs  T(C): 36.8, Max: 36.8 (06-13 @ 13:57)  T(F): 98.2, Max: 98.2 (06-13 @ 13:57)  HR: 72 (67 - 72)  BP: --  BP(mean): 82 (78 - 82)  RR: 18 (16 - 18)  SpO2: 98% (97% - 98%)    PHYSICAL EXAM:  Constitutional:no acute distress  Eyes:ROBSON, EOMI  Ear/Nose/Throat: no oral lesions, 	  Respiratory: clear BL  Cardiovascular: S1S2  Gastrointestinal:soft, (+) BS, no tenderness at LLQ  Extremities:no e/e/c  No Lymphadenopathy  IV sites not inflammed.    LABS:                        12.0   4.9   )-----------( 237      ( 13 Jun 2017 05:18 )             37.0     06-13    140  |  102  |  8   ----------------------------<  93  4.3   |  22  |  0.89    Ca    9.2      13 Jun 2017 05:18      PT/INR - ( 13 Jun 2017 05:18 )   PT: 23.7 sec;   INR: 2.14 ratio      MICROBIOLOGY: no positive Cx        RADIOLOGY & ADDITIONAL STUDIES: repeat CT no sig change.

## 2017-06-13 NOTE — PROGRESS NOTE ADULT - ASSESSMENT
This is a 71 year old female with hx of LVAD (2015) on coumadin admitted w/ Hinchey II diverticulitis (~3cm cesar-colonic abscess)     - Reg diet  - IV Abx (Invanz) x 2 weeks  - PICC placement for outpatient abx  - CTS for LVAD management  - D/c planning today v. tomorrow with VNS   - outpatient colonoscopy in 6-8 weeks with colorectal surgery    Page [phone] This is a 71 year old female with hx of LVAD (2015) on coumadin admitted w/ Hinchey II diverticulitis (~3cm cesar-colonic abscess)     - Reg diet  - IV Abx (Invanz) x 2 weeks  - PICC placement for outpatient abx  - CTS for LVAD management  - D/c planning today v. tomorrow with VNS   - outpatient colonoscopy in 6-8 weeks with colorectal surgery    LEONA Daley PGY1  Pager: [phone] This is a 71 year old female with hx of LVAD (2015) on coumadin admitted w/ Hinchey II diverticulitis (~3cm cesar-colonic abscess)     - Reg diet  - IV Abx (Invanz) x 2 weeks  - PICC placement for outpatient abx  - CTS for LVAD management  - D/c planning today v. tomorrow with VNS     LEONA Daley PGY1  Pager: [phone]

## 2017-06-13 NOTE — PROGRESS NOTE ADULT - PROBLEM SELECTOR PLAN 1
complicated diverticulitis with small pericolonic abscess/phlegmon. clinically improved, CT images today reviewed showing stable findings.   - d/c home with PICC/IV Invanz  - pt had recent colonoscopy, so she will not need repeat colonoscopy

## 2017-06-13 NOTE — PROGRESS NOTE ADULT - PROBLEM SELECTOR PLAN 2
Repeat CT abd/pelvis with IV contrast today. Patient is NPO for test  As per Trauma Surgery, abx duration will be dependent on latest CT scan findings and recommendation from ID  ID consulted Continue low fiber diet.  ID following, pt on IV Invanz 1g daily for 2 weeks until 6/26.  PICC line today in IR for outpt abx.  Pt to f/u with colorectal surgery Dr. Wiley in 6-8 weeks for outpt colonoscopy.

## 2017-06-14 VITALS — RESPIRATION RATE: 18 BRPM | OXYGEN SATURATION: 99 % | HEART RATE: 68 BPM | TEMPERATURE: 98 F

## 2017-06-14 LAB
ANION GAP SERPL CALC-SCNC: 13 MMOL/L — SIGNIFICANT CHANGE UP (ref 5–17)
BUN SERPL-MCNC: 8 MG/DL — SIGNIFICANT CHANGE UP (ref 7–23)
CALCIUM SERPL-MCNC: 9.5 MG/DL — SIGNIFICANT CHANGE UP (ref 8.4–10.5)
CHLORIDE SERPL-SCNC: 102 MMOL/L — SIGNIFICANT CHANGE UP (ref 96–108)
CO2 SERPL-SCNC: 25 MMOL/L — SIGNIFICANT CHANGE UP (ref 22–31)
CREAT SERPL-MCNC: 0.81 MG/DL — SIGNIFICANT CHANGE UP (ref 0.5–1.3)
CULTURE RESULTS: SIGNIFICANT CHANGE UP
CULTURE RESULTS: SIGNIFICANT CHANGE UP
GLUCOSE SERPL-MCNC: 91 MG/DL — SIGNIFICANT CHANGE UP (ref 70–99)
HCT VFR BLD CALC: 36.3 % — SIGNIFICANT CHANGE UP (ref 34.5–45)
HGB BLD-MCNC: 11.7 G/DL — SIGNIFICANT CHANGE UP (ref 11.5–15.5)
INR BLD: 2.46 RATIO — HIGH (ref 0.88–1.16)
LDH SERPL L TO P-CCNC: 286 U/L — HIGH (ref 50–242)
MCHC RBC-ENTMCNC: 30.1 PG — SIGNIFICANT CHANGE UP (ref 27–34)
MCHC RBC-ENTMCNC: 32.2 GM/DL — SIGNIFICANT CHANGE UP (ref 32–36)
MCV RBC AUTO: 93.7 FL — SIGNIFICANT CHANGE UP (ref 80–100)
PLATELET # BLD AUTO: 257 K/UL — SIGNIFICANT CHANGE UP (ref 150–400)
POTASSIUM SERPL-MCNC: 4.1 MMOL/L — SIGNIFICANT CHANGE UP (ref 3.5–5.3)
POTASSIUM SERPL-SCNC: 4.1 MMOL/L — SIGNIFICANT CHANGE UP (ref 3.5–5.3)
PROTHROM AB SERPL-ACNC: 27.1 SEC — HIGH (ref 9.8–12.7)
RBC # BLD: 3.87 M/UL — SIGNIFICANT CHANGE UP (ref 3.8–5.2)
RBC # FLD: 12.3 % — SIGNIFICANT CHANGE UP (ref 10.3–14.5)
SODIUM SERPL-SCNC: 140 MMOL/L — SIGNIFICANT CHANGE UP (ref 135–145)
SPECIMEN SOURCE: SIGNIFICANT CHANGE UP
SPECIMEN SOURCE: SIGNIFICANT CHANGE UP
WBC # BLD: 5.3 K/UL — SIGNIFICANT CHANGE UP (ref 3.8–10.5)
WBC # FLD AUTO: 5.3 K/UL — SIGNIFICANT CHANGE UP (ref 3.8–10.5)

## 2017-06-14 PROCEDURE — 99284 EMERGENCY DEPT VISIT MOD MDM: CPT | Mod: 25

## 2017-06-14 PROCEDURE — 71045 X-RAY EXAM CHEST 1 VIEW: CPT

## 2017-06-14 PROCEDURE — 85027 COMPLETE CBC AUTOMATED: CPT

## 2017-06-14 PROCEDURE — 83605 ASSAY OF LACTIC ACID: CPT

## 2017-06-14 PROCEDURE — 74177 CT ABD & PELVIS W/CONTRAST: CPT

## 2017-06-14 PROCEDURE — 82947 ASSAY GLUCOSE BLOOD QUANT: CPT

## 2017-06-14 PROCEDURE — 74176 CT ABD & PELVIS W/O CONTRAST: CPT

## 2017-06-14 PROCEDURE — C1751: CPT

## 2017-06-14 PROCEDURE — 86850 RBC ANTIBODY SCREEN: CPT

## 2017-06-14 PROCEDURE — 99231 SBSQ HOSP IP/OBS SF/LOW 25: CPT

## 2017-06-14 PROCEDURE — 85610 PROTHROMBIN TIME: CPT

## 2017-06-14 PROCEDURE — 83735 ASSAY OF MAGNESIUM: CPT

## 2017-06-14 PROCEDURE — 82803 BLOOD GASES ANY COMBINATION: CPT

## 2017-06-14 PROCEDURE — 84100 ASSAY OF PHOSPHORUS: CPT

## 2017-06-14 PROCEDURE — 99233 SBSQ HOSP IP/OBS HIGH 50: CPT | Mod: GC

## 2017-06-14 PROCEDURE — 82435 ASSAY OF BLOOD CHLORIDE: CPT

## 2017-06-14 PROCEDURE — 86900 BLOOD TYPING SEROLOGIC ABO: CPT

## 2017-06-14 PROCEDURE — 80053 COMPREHEN METABOLIC PANEL: CPT

## 2017-06-14 PROCEDURE — 96375 TX/PRO/DX INJ NEW DRUG ADDON: CPT

## 2017-06-14 PROCEDURE — 82330 ASSAY OF CALCIUM: CPT

## 2017-06-14 PROCEDURE — 76937 US GUIDE VASCULAR ACCESS: CPT

## 2017-06-14 PROCEDURE — 87040 BLOOD CULTURE FOR BACTERIA: CPT

## 2017-06-14 PROCEDURE — 96374 THER/PROPH/DIAG INJ IV PUSH: CPT

## 2017-06-14 PROCEDURE — 84132 ASSAY OF SERUM POTASSIUM: CPT

## 2017-06-14 PROCEDURE — 83615 LACTATE (LD) (LDH) ENZYME: CPT

## 2017-06-14 PROCEDURE — 81001 URINALYSIS AUTO W/SCOPE: CPT

## 2017-06-14 PROCEDURE — 80048 BASIC METABOLIC PNL TOTAL CA: CPT

## 2017-06-14 PROCEDURE — 84295 ASSAY OF SERUM SODIUM: CPT

## 2017-06-14 PROCEDURE — 36569 INSJ PICC 5 YR+ W/O IMAGING: CPT

## 2017-06-14 PROCEDURE — 85730 THROMBOPLASTIN TIME PARTIAL: CPT

## 2017-06-14 PROCEDURE — 86901 BLOOD TYPING SEROLOGIC RH(D): CPT

## 2017-06-14 PROCEDURE — 77001 FLUOROGUIDE FOR VEIN DEVICE: CPT

## 2017-06-14 PROCEDURE — 71010: CPT | Mod: 26

## 2017-06-14 PROCEDURE — 85014 HEMATOCRIT: CPT

## 2017-06-14 RX ORDER — DIGOXIN 250 MCG
1 TABLET ORAL
Qty: 0 | Refills: 0 | COMMUNITY
Start: 2017-06-14

## 2017-06-14 RX ORDER — LOSARTAN POTASSIUM 100 MG/1
1 TABLET, FILM COATED ORAL
Qty: 30 | Refills: 0 | OUTPATIENT
Start: 2017-06-14 | End: 2017-07-14

## 2017-06-14 RX ORDER — FUROSEMIDE 40 MG
1 TABLET ORAL
Qty: 30 | Refills: 0 | OUTPATIENT
Start: 2017-06-14 | End: 2017-07-14

## 2017-06-14 RX ORDER — POTASSIUM CHLORIDE 20 MEQ
1 PACKET (EA) ORAL
Qty: 30 | Refills: 0 | OUTPATIENT
Start: 2017-06-14 | End: 2017-07-14

## 2017-06-14 RX ORDER — LOPERAMIDE HCL 2 MG
1 TABLET ORAL
Qty: 42 | Refills: 0 | OUTPATIENT
Start: 2017-06-14 | End: 2017-06-21

## 2017-06-14 RX ORDER — PANTOPRAZOLE SODIUM 20 MG/1
1 TABLET, DELAYED RELEASE ORAL
Qty: 7 | Refills: 0 | OUTPATIENT
Start: 2017-06-14 | End: 2017-06-21

## 2017-06-14 RX ORDER — ASPIRIN/CALCIUM CARB/MAGNESIUM 324 MG
1 TABLET ORAL
Qty: 30 | Refills: 0 | OUTPATIENT
Start: 2017-06-14 | End: 2017-07-14

## 2017-06-14 RX ADMIN — PANTOPRAZOLE SODIUM 40 MILLIGRAM(S): 20 TABLET, DELAYED RELEASE ORAL at 06:36

## 2017-06-14 RX ADMIN — Medication 0.12 MILLIGRAM(S): at 06:36

## 2017-06-14 RX ADMIN — LOSARTAN POTASSIUM 50 MILLIGRAM(S): 100 TABLET, FILM COATED ORAL at 06:36

## 2017-06-14 RX ADMIN — Medication 81 MILLIGRAM(S): at 11:51

## 2017-06-14 RX ADMIN — Medication 40 MILLIGRAM(S): at 06:36

## 2017-06-14 RX ADMIN — Medication 25 MILLIGRAM(S): at 06:36

## 2017-06-14 RX ADMIN — Medication 20 MILLIEQUIVALENT(S): at 11:51

## 2017-06-14 RX ADMIN — ERTAPENEM SODIUM 120 MILLIGRAM(S): 1 INJECTION, POWDER, LYOPHILIZED, FOR SOLUTION INTRAMUSCULAR; INTRAVENOUS at 13:51

## 2017-06-14 NOTE — DISCHARGE NOTE ADULT - CARE PROVIDERS DIRECT ADDRESSES
[email or web address]

## 2017-06-14 NOTE — DISCHARGE NOTE ADULT - MEDICATION SUMMARY - MEDICATIONS TO STOP TAKING
I will STOP taking the medications listed below when I get home from the hospital:    heparin  -- 5000 unit(s) subcutaneous 2 times a day    furosemide  --  500mg/250cc D5W continuous intravenous @ 20mg/hr = 10cc/hr    milrinone 200 mcg/mL-D5% intravenous solution  -- 20mg/100cc D5W @ 0.35 mcg/kg/min intravenous continuous    Cipro 500 mg oral tablet  -- 1 tab(s) by mouth every 12 hours  -- Avoid prolonged or excessive exposure to direct and/or artificial sunlight while taking this medication.  Check with your doctor before becoming pregnant.  Do not take dairy products, antacids, or iron preparations within one hour of this medication.  Finish all this medication unless otherwise directed by prescriber.  Medication should be taken with plenty of water.    Flagyl 500 mg oral tablet  -- 1 tab(s) by mouth every 8 hours  -- Do not drink alcoholic beverages when taking this medication.  Finish all this medication unless otherwise directed by prescriber.  May discolor urine or feces.

## 2017-06-14 NOTE — DISCHARGE NOTE ADULT - NS AS ACTIVITY OBS
Stairs allowed/Return to Work/School allowed/Walking-Outdoors allowed/Showering allowed/Sex allowed/Walking-Indoors allowed

## 2017-06-14 NOTE — PROGRESS NOTE ADULT - PROBLEM SELECTOR PLAN 1
Continue current neurohormonal antagonists.   Continue lasix 40 mg daily. Continue current neurohormonal antagonists.   Continue lasix 40 mg daily.  Currently compensated from a volume perspective

## 2017-06-14 NOTE — PROGRESS NOTE ADULT - PROBLEM SELECTOR PROBLEM 2
Chronic systolic congestive heart failure
Diverticulitis
LVAD (left ventricular assist device) present
LVAD (left ventricular assist device) present
Diverticulitis
LVAD (left ventricular assist device) present
LVAD (left ventricular assist device) present

## 2017-06-14 NOTE — PROGRESS NOTE ADULT - PROBLEM SELECTOR PLAN 1
complicated diverticulitis with small pericolonic abscess/phlegmon. clinically improved with non-op management  - d/c home with PICC/IV Invanz  - will refer to colorectal surgery for follow-up

## 2017-06-14 NOTE — PROGRESS NOTE ADULT - PROBLEM SELECTOR PROBLEM 1
Chronic systolic heart failure
Chronic systolic heart failure
Diverticulitis of large intestine with abscess without bleeding
LVAD (left ventricular assist device) present
Diverticulitis
LVAD (left ventricular assist device) present
Chronic systolic heart failure
Diverticulitis of large intestine with abscess without bleeding

## 2017-06-14 NOTE — DISCHARGE NOTE ADULT - CARE PLAN
Principal Discharge DX:	Diverticulitis  Goal:	Complete Recovery  Instructions for follow-up, activity and diet:	1. Daily Shower  2. Weight yourself daily and notify any weight gain greater than 2-3 pounds in 24 hours.  3. Low fiber diet - low fat, low cholesterol, no added salt. Principal Discharge DX:	Diverticulitis  Goal:	Complete Recovery  Instructions for follow-up, activity and diet:	1. Daily Shower  2. Weight yourself daily and notify any weight gain greater than 2-3 pounds in 24 hours.  3. Low fiber diet - low fat, low cholesterol, no added salt.  4. Continue IV Invanz 1g daily thru RUE PICC line until 7/12/17 per Dr. Helms.  5. Check weekly CMP and fax results to Dr. Helms.

## 2017-06-14 NOTE — PROGRESS NOTE ADULT - PROBLEM SELECTOR PROBLEM 3
Polymyalgia rheumatica
CHF (congestive heart failure)
Diverticulitis of large intestine without perforation or abscess without bleeding
Diverticulitis of large intestine without perforation or abscess without bleeding
CHF (congestive heart failure)
Diverticulitis of large intestine without perforation or abscess without bleeding

## 2017-06-14 NOTE — DISCHARGE NOTE ADULT - PLAN OF CARE
Complete Recovery 1. Daily Shower  2. Weight yourself daily and notify any weight gain greater than 2-3 pounds in 24 hours.  3. Low fiber diet - low fat, low cholesterol, no added salt. 1. Daily Shower  2. Weight yourself daily and notify any weight gain greater than 2-3 pounds in 24 hours.  3. Low fiber diet - low fat, low cholesterol, no added salt.  4. Continue IV Invanz 1g daily thru RUE PICC line until 7/12/17 per Dr. Helms.  5. Check weekly CMP and fax results to Dr. Helms.

## 2017-06-14 NOTE — DISCHARGE NOTE ADULT - CONDITIONS AT DISCHARGE
Stable, VSS, no c/o pain or SOB, LVAD driveline site clean and dry, R PICC site clean and dry, IVL and tele removed

## 2017-06-14 NOTE — DISCHARGE NOTE ADULT - CARE PROVIDER_API CALL
Javier Wiley), Surgery  [address] [place] [address]  Phone: [phone]  Fax: [phone] Javier Wiley), Surgery  [address] [place] [address]  Phone: [phone]  Fax: [phone]    Joshua Helms), Infectious Disease; Internal Medicine  [address] [place] [address]  Phone: [phone]  Fax: [phone]

## 2017-06-14 NOTE — PROGRESS NOTE ADULT - ASSESSMENT
This is a 71 year old female with hx of LVAD (2015) on coumadin admitted w/ Hinchey II diverticulitis (~3cm cesar-colonic abscess)     - Reg diet  - IV Abx (Invanz) x 2 weeks  - PICC placement for outpatient abx  - CTS for LVAD management  - D/c planning today v. tomorrow with VNS     LEONA Daley PGY1  Pager: [phone]

## 2017-06-14 NOTE — DISCHARGE NOTE ADULT - MEDICATION SUMMARY - MEDICATIONS TO TAKE
I will START or STAY ON the medications listed below when I get home from the hospital:    aspirin 81 mg oral tablet, chewable  -- 1 tab(s) by mouth once a day  -- Indication: For anti-platelet    losartan 50 mg oral tablet  -- 1 tab(s) by mouth once a day  -- Indication: For blood pressure    digoxin 125 mcg (0.125 mg) oral tablet  -- 1 tab(s) by mouth once a day  -- Indication: For heart rate    Coumadin 2.5 mg oral tablet  -- 1 tab(s) by mouth once a day  -- Indication: For blood thinner    loperamide 2 mg oral capsule  -- 1 cap(s) by mouth every 4 hours, As needed, Diarrhea  -- Indication: For Diarrhea    atorvastatin 80 mg oral tablet  -- 1 tab(s) by mouth once a day (at bedtime)  -- Indication: For CHolesterol    metoprolol succinate 25 mg oral tablet, extended release  -- 1 tab(s) by mouth once a day  -- Indication: For heart rate    INVanz 1 g injection  -- 1 gram(s) injectable once a day  flush PICC with 10cc NS before and after administration of antibiotic   -- Indication: For Diverticulitis    furosemide 40 mg oral tablet  -- 1 tab(s) by mouth once a day  -- Indication: For Diuretic    potassium chloride 20 mEq oral tablet, extended release  -- 1 tab(s) by mouth once a day  -- Indication: For Potassium supplement    pantoprazole 40 mg oral delayed release tablet  -- 1 tab(s) by mouth once a day (before a meal)  -- Indication: For antacid I will START or STAY ON the medications listed below when I get home from the hospital:    predniSONE 2.5 mg oral tablet  -- 1 tab(s) by mouth 2 times a day  -- Indication: For fibromyalgia -dose by rheumatologist    aspirin 81 mg oral tablet, chewable  -- 1 tab(s) by mouth once a day  -- Indication: For anti-platelet    losartan 50 mg oral tablet  -- 1 tab(s) by mouth once a day  -- Indication: For blood pressure    digoxin 125 mcg (0.125 mg) oral tablet  -- 1 tab(s) by mouth once a day  -- Indication: For heart rate    Coumadin 2.5 mg oral tablet  -- 1 tab(s) by mouth once a day  -- Indication: For blood thinner    loperamide 2 mg oral capsule  -- 1 cap(s) by mouth every 4 hours, As needed, Diarrhea  -- Indication: For Diarrhea    atorvastatin 80 mg oral tablet  -- 1 tab(s) by mouth once a day (at bedtime)  -- Indication: For CHolesterol    metoprolol succinate 25 mg oral tablet, extended release  -- 1 tab(s) by mouth once a day  -- Indication: For heart rate    INVanz 1 g injection  -- 1 gram(s) injectable once a day  flush PICC with 10cc NS before and after administration of antibiotic   -- Indication: For Diverticulitis    furosemide 40 mg oral tablet  -- 1 tab(s) by mouth once a day  -- Indication: For Diuretic    potassium chloride 20 mEq oral tablet, extended release  -- 1 tab(s) by mouth once a day  -- Indication: For Potassium supplement    pantoprazole 40 mg oral delayed release tablet  -- 1 tab(s) by mouth once a day (before a meal)  -- Indication: For antacid

## 2017-06-14 NOTE — DISCHARGE NOTE ADULT - ADDITIONAL INSTRUCTIONS
Follow up with surgeon Dr. Wiley in 1-2 weeks. Call [phone] to schedule appointment. Will need colonoscopy in 6-8 weeks. Repeat CT scan in 3 weeks to re-evaluate abscess.  Follow up with ID Dr. Helms in 1-2 weeks. Call [phone] to schedule appointment. Continue IV Invanz until 7/12.  Follow up with your cardiologist and heart failure team in 1 week after discharge. Call to schedule appointment. Follow up with surgeon Dr. Wiley in 1-2 weeks. Call [phone] to schedule appointment. Will need colonoscopy in 6-8 weeks. Repeat CT scan in 3 weeks to re-evaluate abscess.  Follow up with ID Dr. Helms in 1-2 weeks. Call [phone] to schedule appointment. Continue IV Invanz until 7/12.  Follow up with your cardiologist and heart failure/LVAD team in 1 week after discharge. Call to schedule appointment.

## 2017-06-14 NOTE — PROGRESS NOTE ADULT - ATTENDING COMMENTS
Clinically stable for discharge. I discussed her care with Dr. Steen at [facility]. She will follow-up with him.

## 2017-06-14 NOTE — DISCHARGE NOTE ADULT - INSTRUCTIONS
Low fiber diet - low fat, low cholesterol, no added salt. Appointments, Diet, Activity, Medication Administration

## 2017-06-14 NOTE — PROGRESS NOTE ADULT - PROBLEM SELECTOR PLAN 3
Improving.   Antibiotics per ID recommendations (2 weeks).  PICC line placed. Improving.   Antibiotics per ID recommendations (2 weeks); now on ertapenem  PICC line placed.

## 2017-06-14 NOTE — DISCHARGE NOTE ADULT - PATIENT PORTAL LINK FT
“You can access the Follow[facility] Patient Portal, offered by [facility], by registering with the following website: [email or web address]”

## 2017-06-14 NOTE — DISCHARGE NOTE ADULT - HOSPITAL COURSE
72 y/o F s/p LVAD implantation 2015 at [facility] on Coumadin, now admitted to [facility] for Hinchey II diverticulitis (~3cm pericolonic abscess), blood cx negative as of 6/9 6/12/17 CTA Abd/Pelvis with IV Cont: Sigmoid diverticulitis with a pericolonic abscess/phlegmon not significantly changed given differences in technique from prior study 6/9/2017. Pt s/p 6/13 RUE PICC for outpt bx IV Invanz 1g daily. 72 y/o F s/p LVAD implantation 2015 at [facility] on Coumadin, who presented with abdominal pain to [facility] ED and admitted for Hinchey II diverticulitis (~3cm pericolonic abscess),  6/9 blood cx NTD. General Surgery following.  6/12/17 CTA Abd/Pelvis with IV Cont: Sigmoid diverticulitis with a pericolonic abscess/phlegmon not significantly changed given differences in technique from prior study 6/9/2017. Pt started on IV Invanz 1g daily on 6/12/17 by ID and now s/p 6/13 RUE PICC for outpt abx to continue until 7/12. Will f/u outpatient for repeat CT and colonoscopy with general surgery.  Discharged home today 6/14 per Dr. Campo.

## 2017-06-14 NOTE — PROGRESS NOTE ADULT - SUBJECTIVE AND OBJECTIVE BOX
ACS DAILY PROGRESS NOTE:       SUBJECTIVE/ROS: Pt feels well, tolerated low residual diet- denies nausea/vomiting. feels back to baseline.       PHYSICAL EXAM:  Constitutional: well developed, well nourished, NAD  Eyes: anicteric  ENMT: normal facies, symmetric  Neck: supple  Respiratory: CTA bilaterally  Cardiovascular: RRR  Gastrointestinal: abdomen soft, nontender, nondistended. No obvious masses. No peritonitis  Extremities: FROM, warm  Neurological: intact, non-focal  Skin: no gross lesions  Lymph Nodes: no gross adenopathy  Musculoskeletal: equal strength bilateral upper and lower extremities  Psychiatric: oriented x 3; appropriate      Vital Signs Last 24 Hrs  T(C): 36.6, Max: 37 (06-13 @ 20:39)  T(F): 97.8, Max: 98.6 (06-13 @ 20:39)  HR: 68 (68 - 73)  BP: --  BP(mean): 78 (76 - 78)  RR: 18 (18 - 18)  SpO2: 99% (98% - 99%)                          11.7   5.3   )-----------( 257      ( 14 Jun 2017 07:13 )             36.3     06-14    140  |  102  |  8   ----------------------------<  91  4.1   |  25  |  0.81    Ca    9.5      14 Jun 2017 07:13

## 2017-07-12 ENCOUNTER — APPOINTMENT (OUTPATIENT)
Dept: INFECTIOUS DISEASE | Facility: CLINIC | Age: 72
End: 2017-07-12

## 2017-07-12 VITALS — BODY MASS INDEX: 27.16 KG/M2 | WEIGHT: 169 LBS | TEMPERATURE: 97.1 F | HEIGHT: 66 IN

## 2017-07-12 DIAGNOSIS — K57.32 DIVERTICULITIS OF LARGE INTESTINE W/OUT PERFORATION OR ABSCESS W/OUT BLEEDING: ICD-10-CM

## 2017-09-01 ENCOUNTER — APPOINTMENT (OUTPATIENT)
Dept: CARDIOTHORACIC SURGERY | Facility: CLINIC | Age: 72
End: 2017-09-01

## 2018-04-26 ENCOUNTER — EMERGENCY (EMERGENCY)
Facility: HOSPITAL | Age: 73
LOS: 1 days | Discharge: HOME | End: 2018-04-26
Attending: EMERGENCY MEDICINE
Payer: COMMERCIAL

## 2018-04-26 VITALS
HEIGHT: 66 IN | WEIGHT: 173.94 LBS | HEART RATE: 71 BPM | RESPIRATION RATE: 18 BRPM | OXYGEN SATURATION: 100 % | TEMPERATURE: 99 F

## 2018-04-26 VITALS
OXYGEN SATURATION: 100 % | RESPIRATION RATE: 16 BRPM | HEART RATE: 70 BPM | TEMPERATURE: 99 F | SYSTOLIC BLOOD PRESSURE: 95 MMHG

## 2018-04-26 DIAGNOSIS — Z95.811 PRESENCE OF HEART ASSIST DEVICE: Chronic | ICD-10-CM

## 2018-04-26 LAB
ALBUMIN SERPL ELPH-MCNC: 4.4 G/DL — SIGNIFICANT CHANGE UP (ref 3.3–5)
ALP SERPL-CCNC: 55 U/L — SIGNIFICANT CHANGE UP (ref 40–120)
ALT FLD-CCNC: 24 U/L — SIGNIFICANT CHANGE UP (ref 10–45)
ANION GAP SERPL CALC-SCNC: 15 MMOL/L — SIGNIFICANT CHANGE UP (ref 5–17)
APTT BLD: 42.7 SEC — HIGH (ref 27.5–37.4)
AST SERPL-CCNC: 67 U/L — HIGH (ref 10–40)
BASE EXCESS BLDV CALC-SCNC: 3.8 MMOL/L — HIGH (ref -2–2)
BILIRUB SERPL-MCNC: 0.6 MG/DL — SIGNIFICANT CHANGE UP (ref 0.2–1.2)
BUN SERPL-MCNC: 12 MG/DL — SIGNIFICANT CHANGE UP (ref 7–23)
CA-I SERPL-SCNC: 1.28 MMOL/L — SIGNIFICANT CHANGE UP (ref 1.12–1.3)
CALCIUM SERPL-MCNC: 9.8 MG/DL — SIGNIFICANT CHANGE UP (ref 8.4–10.5)
CHLORIDE BLDV-SCNC: 109 MMOL/L — HIGH (ref 96–108)
CHLORIDE SERPL-SCNC: 100 MMOL/L — SIGNIFICANT CHANGE UP (ref 96–108)
CO2 BLDV-SCNC: 31 MMOL/L — HIGH (ref 22–30)
CO2 SERPL-SCNC: 25 MMOL/L — SIGNIFICANT CHANGE UP (ref 22–31)
CREAT SERPL-MCNC: 0.86 MG/DL — SIGNIFICANT CHANGE UP (ref 0.5–1.3)
GAS PNL BLDV: 142 MMOL/L — SIGNIFICANT CHANGE UP (ref 136–145)
GAS PNL BLDV: SIGNIFICANT CHANGE UP
GAS PNL BLDV: SIGNIFICANT CHANGE UP
GLUCOSE BLDV-MCNC: 115 MG/DL — HIGH (ref 70–99)
GLUCOSE SERPL-MCNC: 92 MG/DL — SIGNIFICANT CHANGE UP (ref 70–99)
HCO3 BLDV-SCNC: 30 MMOL/L — HIGH (ref 21–29)
HCT VFR BLD CALC: 43.6 % — SIGNIFICANT CHANGE UP (ref 34.5–45)
HCT VFR BLDA CALC: 41 % — SIGNIFICANT CHANGE UP (ref 39–50)
HGB BLD CALC-MCNC: 13.2 G/DL — SIGNIFICANT CHANGE UP (ref 11.5–15.5)
HGB BLD-MCNC: 13.9 G/DL — SIGNIFICANT CHANGE UP (ref 11.5–15.5)
INR BLD: 2.43 RATIO — HIGH (ref 0.88–1.16)
LACTATE BLDV-MCNC: 2.2 MMOL/L — HIGH (ref 0.7–2)
MCHC RBC-ENTMCNC: 30.4 PG — SIGNIFICANT CHANGE UP (ref 27–34)
MCHC RBC-ENTMCNC: 32 GM/DL — SIGNIFICANT CHANGE UP (ref 32–36)
MCV RBC AUTO: 95.2 FL — SIGNIFICANT CHANGE UP (ref 80–100)
PCO2 BLDV: 53 MMHG — HIGH (ref 35–50)
PH BLDV: 7.37 — SIGNIFICANT CHANGE UP (ref 7.35–7.45)
PLATELET # BLD AUTO: 175 K/UL — SIGNIFICANT CHANGE UP (ref 150–400)
PO2 BLDV: 30 MMHG — SIGNIFICANT CHANGE UP (ref 25–45)
POTASSIUM BLDV-SCNC: 3.8 MMOL/L — SIGNIFICANT CHANGE UP (ref 3.5–5)
POTASSIUM SERPL-MCNC: 6.3 MMOL/L — CRITICAL HIGH (ref 3.5–5.3)
POTASSIUM SERPL-SCNC: 6.3 MMOL/L — CRITICAL HIGH (ref 3.5–5.3)
PROT SERPL-MCNC: 8.6 G/DL — HIGH (ref 6–8.3)
PROTHROM AB SERPL-ACNC: 27 SEC — HIGH (ref 9.8–12.7)
RBC # BLD: 4.58 M/UL — SIGNIFICANT CHANGE UP (ref 3.8–5.2)
RBC # FLD: 13 % — SIGNIFICANT CHANGE UP (ref 10.3–14.5)
SAO2 % BLDV: 46 % — LOW (ref 67–88)
SODIUM SERPL-SCNC: 140 MMOL/L — SIGNIFICANT CHANGE UP (ref 135–145)
WBC # BLD: 5.6 K/UL — SIGNIFICANT CHANGE UP (ref 3.8–10.5)
WBC # FLD AUTO: 5.6 K/UL — SIGNIFICANT CHANGE UP (ref 3.8–10.5)

## 2018-04-26 PROCEDURE — 70450 CT HEAD/BRAIN W/O DYE: CPT

## 2018-04-26 PROCEDURE — 82330 ASSAY OF CALCIUM: CPT

## 2018-04-26 PROCEDURE — 85610 PROTHROMBIN TIME: CPT

## 2018-04-26 PROCEDURE — 83605 ASSAY OF LACTIC ACID: CPT

## 2018-04-26 PROCEDURE — 80053 COMPREHEN METABOLIC PANEL: CPT

## 2018-04-26 PROCEDURE — 82803 BLOOD GASES ANY COMBINATION: CPT

## 2018-04-26 PROCEDURE — 84295 ASSAY OF SERUM SODIUM: CPT

## 2018-04-26 PROCEDURE — 82435 ASSAY OF BLOOD CHLORIDE: CPT

## 2018-04-26 PROCEDURE — 85014 HEMATOCRIT: CPT

## 2018-04-26 PROCEDURE — 99284 EMERGENCY DEPT VISIT MOD MDM: CPT

## 2018-04-26 PROCEDURE — 70450 CT HEAD/BRAIN W/O DYE: CPT | Mod: 26

## 2018-04-26 PROCEDURE — 96374 THER/PROPH/DIAG INJ IV PUSH: CPT

## 2018-04-26 PROCEDURE — 82947 ASSAY GLUCOSE BLOOD QUANT: CPT

## 2018-04-26 PROCEDURE — 85730 THROMBOPLASTIN TIME PARTIAL: CPT

## 2018-04-26 PROCEDURE — 84132 ASSAY OF SERUM POTASSIUM: CPT

## 2018-04-26 PROCEDURE — 85027 COMPLETE CBC AUTOMATED: CPT

## 2018-04-26 PROCEDURE — 85652 RBC SED RATE AUTOMATED: CPT

## 2018-04-26 PROCEDURE — 99284 EMERGENCY DEPT VISIT MOD MDM: CPT | Mod: 25

## 2018-04-26 RX ORDER — FERROUS SULFATE 325(65) MG
0 TABLET ORAL
Qty: 0 | Refills: 0 | COMMUNITY

## 2018-04-26 RX ORDER — ACETAMINOPHEN 500 MG
1000 TABLET ORAL ONCE
Qty: 0 | Refills: 0 | Status: DISCONTINUED | OUTPATIENT
Start: 2018-04-26 | End: 2018-04-30

## 2018-04-26 RX ORDER — METOCLOPRAMIDE HCL 10 MG
10 TABLET ORAL ONCE
Qty: 0 | Refills: 0 | Status: COMPLETED | OUTPATIENT
Start: 2018-04-26 | End: 2018-04-26

## 2018-04-26 RX ORDER — WARFARIN SODIUM 2.5 MG/1
1 TABLET ORAL
Qty: 0 | Refills: 0 | COMMUNITY

## 2018-04-26 RX ORDER — PREDNISOLONE 5 MG
1 TABLET ORAL
Qty: 0 | Refills: 0 | COMMUNITY

## 2018-04-26 RX ORDER — LOSARTAN POTASSIUM 100 MG/1
1 TABLET, FILM COATED ORAL
Qty: 0 | Refills: 0 | COMMUNITY

## 2018-04-26 RX ORDER — DOCUSATE SODIUM 100 MG
0 CAPSULE ORAL
Qty: 0 | Refills: 0 | COMMUNITY

## 2018-04-26 RX ADMIN — Medication 10 MILLIGRAM(S): at 19:36

## 2018-04-26 NOTE — ED PROVIDER NOTE - CARE PLAN
Principal Discharge DX:	Headache  Assessment and plan of treatment:	You had a CT of your head that did NOT demonstrate any acute intracranial pathology. We checked your ESR-14 (Normal). Your potassium levels were 3.8-1.4 (Normal).   -Please follow up with your primary medical doctor within 24-48 hours if your symptoms persist.  -Please return to the emergency department if any of your symptoms worsen.

## 2018-04-26 NOTE — ED PROVIDER NOTE - PLAN OF CARE
You had a CT of your head that did NOT demonstrate any acute intracranial pathology. We checked your ESR-14 (Normal). Your potassium levels were 3.8-1.4 (Normal).   -Please follow up with your primary medical doctor within 24-48 hours if your symptoms persist.  -Please return to the emergency department if any of your symptoms worsen.

## 2018-04-26 NOTE — CHART NOTE - NSCHARTNOTEFT_GEN_A_CORE
To whom it may concern,        Kashif Thurman was at [facility] Emergency Department on 4/26/2018.    Sincerely,    Dr. Valencia Gusman D.O.

## 2018-04-26 NOTE — ED PROVIDER NOTE - MEDICAL DECISION MAKING DETAILS
72 y old f with unilateral left temporal headache for 2 days on anticoagulation ,no head trauma ,no fever or chills ,Will obtain blood work ,CT scan of head and on reevaluation: ZR

## 2018-04-26 NOTE — ED ADULT NURSE REASSESSMENT NOTE - NS ED NURSE REASSESS COMMENT FT1
RN spoke with LVAD team at [phone] and Nelsy said to keep them in the loop if they need to participate in the care they will. At this time Nelsy said to notify the pt's LVAD team about her being in the hospital. Pt already notified her own LVAD team. Will continue to monitor pt.

## 2018-04-26 NOTE — ED PROVIDER NOTE - PHYSICAL EXAMINATION
GENERAL: NAD, well-developed  HEAD:  Atraumatic, Normocephalic  EYES: EOMI, PERRLA, conjunctiva and sclera clear  NECK: Supple, No JVD  CHEST/LUNG: Clear to auscultation bilaterally; No wheeze  HEART: Regular rate and rhythm; No murmurs, rubs, or gallops. +LVAD in place.   ABDOMEN: Soft, Nontender, Nondistended; Bowel sounds present  EXTREMITIES:  2+ Peripheral Pulses, No clubbing, cyanosis, or edema  PSYCH: AAOx3  NEUROLOGY: non-focal  SKIN: No rashes or lesions

## 2018-04-26 NOTE — ED PROVIDER NOTE - PROGRESS NOTE DETAILS
Manual doppler BP obtained. Systolic-95mmHg. Unable to obtain diastolic. Doppler US BP obtained: MAP/Systolic 95mmHg

## 2018-04-26 NOTE — ED PROVIDER NOTE - NS ED ROS FT
Review of Systems:     CONSTITUTIONAL: No fever, weight loss, or fatigue  EYES: No eye pain, visual disturbances, or discharge  ENMT:  No difficulty hearing, tinnitus, vertigo; No sinus or throat pain  NECK: No pain or stiffness  RESPIRATORY: No cough, wheezing, chills or hemoptysis; No shortness of breath  CARDIOVASCULAR: No chest pain, palpitations, dizziness, or leg swelling  GASTROINTESTINAL: No abdominal or epigastric pain. No nausea, vomiting, or hematemesis; No diarrhea or constipation. No melena or hematochezia.  GENITOURINARY: No dysuria, frequency, hematuria, or incontinence  NEUROLOGICAL: +Headache, SEE HPI  SKIN: No itching, burning, rashes, or lesions   LYMPH NODES: No enlarged glands  ENDOCRINE: No heat or cold intolerance; No hair loss  MUSCULOSKELETAL: No joint pain or swelling; No muscle, back, or extremity pain  PSYCHIATRIC: No depression, anxiety, mood swings, or difficulty sleeping  HEME/LYMPH: No easy bruising, or bleeding gums  ALLERY AND IMMUNOLOGIC: No hives or eczema

## 2018-04-26 NOTE — ED PROVIDER NOTE - OBJECTIVE STATEMENT
This is a 72F with ischemic cardiomyopathy and chronic systolic heart failure s/p LVAD on coumadin (2015, Dr. Enio Valenzuela) who presents with 2 day history of left-sided, non-radiating temporal headache. Patient tried taking tylenol without relief. Denies any visual changes. Denies any recent illnesses or travel. Denies any CP, SOB, abdominal pain/N/V. This is a 72F with ischemic cardiomyopathy and chronic systolic heart failure s/p LVAD on coumadin (2015, Dr. Enio Valenzuela) who presents with 2 day history of left-sided, non-radiating temporal headache. Patient tried taking Tylenol without relief. Denies any visual changes. Denies any recent illnesses or travel. Denies any CP, SOB, abdominal pain/N/V.

## 2018-04-26 NOTE — ED ADULT NURSE REASSESSMENT NOTE - NS ED NURSE REASSESS COMMENT FT1
Report received from CATALINA Brand RN. as per prior RN, LVAD notified and aware that pt is here in the ER. No intervention needed at this time. Resident at the bedside performing doppler for BP. Pt. is smiling, easy work of breathing. NAD. Safety maintained. Will continue to monitor.

## 2018-04-26 NOTE — ED ADULT NURSE NOTE - OBJECTIVE STATEMENT
73 y/o F, reported to ED from home. A&Ox3, c/o H/A x2 days. Pt reports that 2 nights ago she began having a left temporal H/A. 71 y/o F, reported to ED from home. A&Ox3, c/o H/A x2 days. Pt reports that 2 nights ago she began having a left temporal H/A. Pt reports that her H/A is 6/10 on pain scale. Pt denies taking medication for pain today. Pt report that the pain feels pulsing. Pt denies changes in vision or blurry vision. Pt denies dizziness or lightheadedness. Pt denies photosensitivity or sensitivity to noise. Pt reports that she has an LVAD machine. Pt reports that the LVAD was inserted 3 years ago due to CHF. Pt reports that she can only take Tylenol for her pain and reports no relief. Pt's LVAD is fully charged and she has backup batteries if needed. Pt denies SOB, C/P, N/V/D, abd pain. Pt denies fever or chills. Will continue to monitor pt.

## 2018-08-26 ENCOUNTER — EMERGENCY (EMERGENCY)
Facility: HOSPITAL | Age: 73
LOS: 1 days | Discharge: HOME | End: 2018-08-26
Attending: EMERGENCY MEDICINE
Payer: COMMERCIAL

## 2018-08-26 VITALS
HEART RATE: 82 BPM | OXYGEN SATURATION: 99 % | RESPIRATION RATE: 18 BRPM | WEIGHT: 166.89 LBS | DIASTOLIC BLOOD PRESSURE: 81 MMHG | HEIGHT: 66 IN | SYSTOLIC BLOOD PRESSURE: 104 MMHG | TEMPERATURE: 98 F

## 2018-08-26 VITALS
TEMPERATURE: 98 F | OXYGEN SATURATION: 100 % | DIASTOLIC BLOOD PRESSURE: 64 MMHG | HEART RATE: 67 BPM | SYSTOLIC BLOOD PRESSURE: 128 MMHG | RESPIRATION RATE: 16 BRPM

## 2018-08-26 DIAGNOSIS — Z95.811 PRESENCE OF HEART ASSIST DEVICE: Chronic | ICD-10-CM

## 2018-08-26 LAB
ALBUMIN SERPL ELPH-MCNC: 4.1 G/DL — SIGNIFICANT CHANGE UP (ref 3.3–5)
ALP SERPL-CCNC: 74 U/L — SIGNIFICANT CHANGE UP (ref 40–120)
ALT FLD-CCNC: 24 U/L — SIGNIFICANT CHANGE UP (ref 10–45)
ANION GAP SERPL CALC-SCNC: 13 MMOL/L — SIGNIFICANT CHANGE UP (ref 5–17)
APTT BLD: 43.2 SEC — HIGH (ref 27.5–37.4)
AST SERPL-CCNC: 32 U/L — SIGNIFICANT CHANGE UP (ref 10–40)
BASE EXCESS BLDV CALC-SCNC: 1.9 MMOL/L — SIGNIFICANT CHANGE UP (ref -2–2)
BASOPHILS # BLD AUTO: 0 K/UL — SIGNIFICANT CHANGE UP (ref 0–0.2)
BASOPHILS # BLD AUTO: 0 K/UL — SIGNIFICANT CHANGE UP (ref 0–0.2)
BASOPHILS NFR BLD AUTO: 0.2 % — SIGNIFICANT CHANGE UP (ref 0–2)
BASOPHILS NFR BLD AUTO: 0.3 % — SIGNIFICANT CHANGE UP (ref 0–2)
BILIRUB SERPL-MCNC: 0.6 MG/DL — SIGNIFICANT CHANGE UP (ref 0.2–1.2)
BLD GP AB SCN SERPL QL: NEGATIVE — SIGNIFICANT CHANGE UP
BUN SERPL-MCNC: 13 MG/DL — SIGNIFICANT CHANGE UP (ref 7–23)
CA-I SERPL-SCNC: 1.22 MMOL/L — SIGNIFICANT CHANGE UP (ref 1.12–1.3)
CALCIUM SERPL-MCNC: 9.7 MG/DL — SIGNIFICANT CHANGE UP (ref 8.4–10.5)
CHLORIDE BLDV-SCNC: 106 MMOL/L — SIGNIFICANT CHANGE UP (ref 96–108)
CHLORIDE SERPL-SCNC: 105 MMOL/L — SIGNIFICANT CHANGE UP (ref 96–108)
CO2 BLDV-SCNC: 30 MMOL/L — SIGNIFICANT CHANGE UP (ref 22–30)
CO2 SERPL-SCNC: 25 MMOL/L — SIGNIFICANT CHANGE UP (ref 22–31)
CREAT SERPL-MCNC: 0.98 MG/DL — SIGNIFICANT CHANGE UP (ref 0.5–1.3)
EOSINOPHIL # BLD AUTO: 0 K/UL — SIGNIFICANT CHANGE UP (ref 0–0.5)
EOSINOPHIL # BLD AUTO: 0.1 K/UL — SIGNIFICANT CHANGE UP (ref 0–0.5)
EOSINOPHIL NFR BLD AUTO: 1 % — SIGNIFICANT CHANGE UP (ref 0–6)
EOSINOPHIL NFR BLD AUTO: 2.2 % — SIGNIFICANT CHANGE UP (ref 0–6)
GAS PNL BLDV: 140 MMOL/L — SIGNIFICANT CHANGE UP (ref 136–145)
GAS PNL BLDV: SIGNIFICANT CHANGE UP
GAS PNL BLDV: SIGNIFICANT CHANGE UP
GLUCOSE BLDV-MCNC: 100 MG/DL — HIGH (ref 70–99)
GLUCOSE SERPL-MCNC: 110 MG/DL — HIGH (ref 70–99)
HCO3 BLDV-SCNC: 28 MMOL/L — SIGNIFICANT CHANGE UP (ref 21–29)
HCT VFR BLD CALC: 36.3 % — SIGNIFICANT CHANGE UP (ref 34.5–45)
HCT VFR BLD CALC: 39.3 % — SIGNIFICANT CHANGE UP (ref 34.5–45)
HCT VFR BLDA CALC: 42 % — SIGNIFICANT CHANGE UP (ref 39–50)
HGB BLD CALC-MCNC: 13.7 G/DL — SIGNIFICANT CHANGE UP (ref 11.5–15.5)
HGB BLD-MCNC: 11.7 G/DL — SIGNIFICANT CHANGE UP (ref 11.5–15.5)
HGB BLD-MCNC: 12.4 G/DL — SIGNIFICANT CHANGE UP (ref 11.5–15.5)
INR BLD: 2.6 RATIO — HIGH (ref 0.88–1.16)
LACTATE BLDV-MCNC: 2.4 MMOL/L — HIGH (ref 0.7–2)
LDH SERPL L TO P-CCNC: 394 U/L — HIGH (ref 50–242)
LYMPHOCYTES # BLD AUTO: 1.5 K/UL — SIGNIFICANT CHANGE UP (ref 1–3.3)
LYMPHOCYTES # BLD AUTO: 1.6 K/UL — SIGNIFICANT CHANGE UP (ref 1–3.3)
LYMPHOCYTES # BLD AUTO: 26.8 % — SIGNIFICANT CHANGE UP (ref 13–44)
LYMPHOCYTES # BLD AUTO: 30.1 % — SIGNIFICANT CHANGE UP (ref 13–44)
MCHC RBC-ENTMCNC: 29.3 PG — SIGNIFICANT CHANGE UP (ref 27–34)
MCHC RBC-ENTMCNC: 29.9 PG — SIGNIFICANT CHANGE UP (ref 27–34)
MCHC RBC-ENTMCNC: 31.6 GM/DL — LOW (ref 32–36)
MCHC RBC-ENTMCNC: 32.3 GM/DL — SIGNIFICANT CHANGE UP (ref 32–36)
MCV RBC AUTO: 92.4 FL — SIGNIFICANT CHANGE UP (ref 80–100)
MCV RBC AUTO: 92.9 FL — SIGNIFICANT CHANGE UP (ref 80–100)
MONOCYTES # BLD AUTO: 0.5 K/UL — SIGNIFICANT CHANGE UP (ref 0–0.9)
MONOCYTES # BLD AUTO: 0.6 K/UL — SIGNIFICANT CHANGE UP (ref 0–0.9)
MONOCYTES NFR BLD AUTO: 11.8 % — SIGNIFICANT CHANGE UP (ref 2–14)
MONOCYTES NFR BLD AUTO: 9.4 % — SIGNIFICANT CHANGE UP (ref 2–14)
NEUTROPHILS # BLD AUTO: 2.8 K/UL — SIGNIFICANT CHANGE UP (ref 1.8–7.4)
NEUTROPHILS # BLD AUTO: 3.6 K/UL — SIGNIFICANT CHANGE UP (ref 1.8–7.4)
NEUTROPHILS NFR BLD AUTO: 56.9 % — SIGNIFICANT CHANGE UP (ref 43–77)
NEUTROPHILS NFR BLD AUTO: 61.2 % — SIGNIFICANT CHANGE UP (ref 43–77)
NT-PROBNP SERPL-SCNC: 4081 PG/ML — HIGH (ref 0–300)
OB PNL STL: POSITIVE
PCO2 BLDV: 53 MMHG — HIGH (ref 35–50)
PH BLDV: 7.34 — LOW (ref 7.35–7.45)
PLATELET # BLD AUTO: 161 K/UL — SIGNIFICANT CHANGE UP (ref 150–400)
PLATELET # BLD AUTO: 179 K/UL — SIGNIFICANT CHANGE UP (ref 150–400)
PO2 BLDV: 30 MMHG — SIGNIFICANT CHANGE UP (ref 25–45)
POTASSIUM BLDV-SCNC: 4.7 MMOL/L — SIGNIFICANT CHANGE UP (ref 3.5–5.3)
POTASSIUM SERPL-MCNC: 5 MMOL/L — SIGNIFICANT CHANGE UP (ref 3.5–5.3)
POTASSIUM SERPL-SCNC: 5 MMOL/L — SIGNIFICANT CHANGE UP (ref 3.5–5.3)
PROT SERPL-MCNC: 7.6 G/DL — SIGNIFICANT CHANGE UP (ref 6–8.3)
PROTHROM AB SERPL-ACNC: 28.9 SEC — HIGH (ref 9.8–12.7)
RBC # BLD: 3.93 M/UL — SIGNIFICANT CHANGE UP (ref 3.8–5.2)
RBC # BLD: 4.23 M/UL — SIGNIFICANT CHANGE UP (ref 3.8–5.2)
RBC # FLD: 13 % — SIGNIFICANT CHANGE UP (ref 10.3–14.5)
RBC # FLD: 13 % — SIGNIFICANT CHANGE UP (ref 10.3–14.5)
RH IG SCN BLD-IMP: POSITIVE — SIGNIFICANT CHANGE UP
SAO2 % BLDV: 49 % — LOW (ref 67–88)
SODIUM SERPL-SCNC: 143 MMOL/L — SIGNIFICANT CHANGE UP (ref 135–145)
WBC # BLD: 5 K/UL — SIGNIFICANT CHANGE UP (ref 3.8–10.5)
WBC # BLD: 5.8 K/UL — SIGNIFICANT CHANGE UP (ref 3.8–10.5)
WBC # FLD AUTO: 5 K/UL — SIGNIFICANT CHANGE UP (ref 3.8–10.5)
WBC # FLD AUTO: 5.8 K/UL — SIGNIFICANT CHANGE UP (ref 3.8–10.5)

## 2018-08-26 PROCEDURE — 82803 BLOOD GASES ANY COMBINATION: CPT

## 2018-08-26 PROCEDURE — 99285 EMERGENCY DEPT VISIT HI MDM: CPT

## 2018-08-26 PROCEDURE — 84132 ASSAY OF SERUM POTASSIUM: CPT

## 2018-08-26 PROCEDURE — 82435 ASSAY OF BLOOD CHLORIDE: CPT

## 2018-08-26 PROCEDURE — 86900 BLOOD TYPING SEROLOGIC ABO: CPT

## 2018-08-26 PROCEDURE — 83605 ASSAY OF LACTIC ACID: CPT

## 2018-08-26 PROCEDURE — 83615 LACTATE (LD) (LDH) ENZYME: CPT

## 2018-08-26 PROCEDURE — 84295 ASSAY OF SERUM SODIUM: CPT

## 2018-08-26 PROCEDURE — 83880 ASSAY OF NATRIURETIC PEPTIDE: CPT

## 2018-08-26 PROCEDURE — 86850 RBC ANTIBODY SCREEN: CPT

## 2018-08-26 PROCEDURE — 85027 COMPLETE CBC AUTOMATED: CPT

## 2018-08-26 PROCEDURE — 86901 BLOOD TYPING SEROLOGIC RH(D): CPT

## 2018-08-26 PROCEDURE — 82272 OCCULT BLD FECES 1-3 TESTS: CPT

## 2018-08-26 PROCEDURE — 80053 COMPREHEN METABOLIC PANEL: CPT

## 2018-08-26 PROCEDURE — 82947 ASSAY GLUCOSE BLOOD QUANT: CPT

## 2018-08-26 PROCEDURE — 85610 PROTHROMBIN TIME: CPT

## 2018-08-26 PROCEDURE — 85730 THROMBOPLASTIN TIME PARTIAL: CPT

## 2018-08-26 PROCEDURE — 85014 HEMATOCRIT: CPT

## 2018-08-26 PROCEDURE — 82330 ASSAY OF CALCIUM: CPT

## 2018-08-26 NOTE — ED ADULT NURSE NOTE - ED STAT RN HANDOFF DETAILS
Bedside report given to on coming nurse Toney Johnston. Understands pmh, medications given and plan of care for patient. Patient in stable condition, vital signs updated, has no complaints at this time and has been updated on care plan.

## 2018-08-26 NOTE — ED ADULT NURSE NOTE - NSIMPLEMENTINTERV_GEN_ALL_ED
Implemented All Fall with Harm Risk Interventions:  [place] to call system. Call bell, personal items and telephone within reach. Instruct patient to call for assistance. Room bathroom lighting operational. Non-slip footwear when patient is off stretcher. Physically safe environment: no spills, clutter or unnecessary equipment. Stretcher in lowest position, wheels locked, appropriate side rails in place. Provide visual cue, wrist band, yellow gown, etc. Monitor gait and stability. Monitor for mental status changes and reorient to person, place, and time. Review medications for side effects contributing to fall risk. Reinforce activity limits and safety measures with patient and family. Provide visual clues: red socks.

## 2018-08-26 NOTE — ED PROVIDER NOTE - OBJECTIVE STATEMENT
74yo F with HTN, MI, HF s/p LVAD (HeartMate II) placement 2015 at [facility] presenting with dark red blood per rectum today. Pt reports she woke up feeling well and noticed popped blood vessel in left eye. Pt had BM around 12pm and reports dark red blood on toilet paper when she wiped along with a few small clots in the toilet. Pt reports a second bloody BM later in day. Pt endorses some lightheadedness but admits to not eating much today and reports this feeling is typical for her. Also admits to chronic dark stool 2/2 iron supplements but today was different. Denies any CP, palpitations, SOB, abdominal pain, n/v/d, history of hemorrhoids, dysuria, hematuria. 74yo F with HTN, MI, HF s/p LVAD (HeartMate II) placement 2015 at [facility], on coumadin, presenting with dark red blood per rectum today. Pt reports she woke up feeling well and noticed popped blood vessel in left eye. Pt had BM around 12pm and reports dark red blood on toilet paper when she wiped along with a few small clots in the toilet. Pt reports a second bloody BM later in day. Pt endorses some lightheadedness but admits to not eating much today and reports this feeling is typical for her. Also admits to chronic dark stool 2/2 iron supplements but today was different. Denies any CP, palpitations, SOB, abdominal pain, n/v/d, history of hemorrhoids, dysuria, hematuria. 72yo F with polymyalgia, HTN, MI, HF s/p LVAD (HeartMate II) placement 2015 at [facility], on coumadin, presenting with dark red blood per rectum today. Pt reports she woke up feeling well and noticed popped blood vessel in left eye. Pt had BM around 12pm and reports dark red blood on toilet paper when she wiped along with a few small clots in the toilet. Pt reports a second bloody BM later in day. Pt endorses some lightheadedness but admits to not eating much today and reports this feeling is typical for her. Also admits to chronic dark stool 2/2 iron supplements but today was different. Denies any fever/chills, CP, palpitations, SOB, abdominal pain, n/v/d, history of hemorrhoids, dysuria, hematuria.

## 2018-08-26 NOTE — ED PROVIDER NOTE - MEDICAL DECISION MAKING DETAILS
ATTG: : rectal bleeding with LVAD on coumadin, check labs. check pressure, contact LVAD team at [facility] and [facility].

## 2018-08-26 NOTE — ED ADULT TRIAGE NOTE - CHIEF COMPLAINT QUOTE
pt saw dark red blood in her stool today, dizziness  LVAD patient pt saw dark red blood in her stool today, dizziness  LVAD patient- spoke with Nelsy from LVAD team on cell phone. Nicolas grider

## 2018-08-26 NOTE — ED ADULT NURSE NOTE - CHIEF COMPLAINT QUOTE
pt saw dark red blood in her stool today, dizziness  LVAD patient- spoke with Nelsy from LVAD team on cell phone. Nicolas grider

## 2018-08-26 NOTE — ED PROVIDER NOTE - PROGRESS NOTE DETAILS
ATTG: : I called and spoke with LVAD team Nelsy. agree with plan to check labs and re eval for dispo. patient follows at [facility]. Dr. Steen at [facility]. - Halie Jaffe PA-C Dr. Steen at [facility] paged. Pt reports additional episode of bloody BM, this time reports more blood than earlier. Will repeat CBC. - Halie Jaffe PA-C Called by Man LVAD Nurse coordinatorBethany. Reports they would like to transfer patient to their hospital. Will call back after discussing with covering attending. - Halie Jaffe PA-C Called back by Bethany. Repeat CBC results discussed. Prefer patient to be transferred. Plan ok with patient and family. - Halie Jaffe PA-C Dr. Steen at [facility] paged. [phone]. Pt reports additional episode of bloody BM, this time reports more blood than earlier. Will repeat CBC. - Halie Jaffe PA-C Accepting physician: Dr. Lucho Fox  [address]  - Halie Jaffe PA-C ATTG: : awaiting transport to [facility]. accepting md as above. patient just changed her battery, and adequate charge for 12 hrs.

## 2018-08-26 NOTE — ED PROVIDER NOTE - ATTENDING CONTRIBUTION TO CARE
72F with ischemic cardiomyopathy and chronic systolic heart failure [facility] presents for dark stools and blood. Family with her at bedside. began today. states she had 2 bowel movements today and notes dark colored stool (chronic issue) and blood in toilet with clots on the second bm. Also feels dizziness mild. no chest pain or shortness of breath. no abd pain. no weakness or numbness. no fever.pt states she also notes left eye with some redness today of conjunctiva. no trauma. no straining.   Gen.  no acute distress, pleasant female.  HEENT:  PERRL EOMI, left eye with subconjunctival hemorrage. no injection of conjunctiva  Lungs:  b/l bs  CVS: LVAD device in place  Abd;  soft non tend. exam done by PAWAN JOHNSON no hemorrhoids no gross blood + dark stool.   Ext: no edema no erythema  Neuro: aaox3 no focal deficits  MSK: 5/5 x 4 ext

## 2018-08-26 NOTE — ED ADULT NURSE NOTE - OBJECTIVE STATEMENT
72 y/o female presents to ed c/o x2 episodes of BRB in stool today a/w dizziness. PT has LVAD, LVAD team contacted in triage. Denies chest pain, sob, ha, n/v/d, abdominal pain, f/c, urinary symptoms, hematuria. A&Ox4, vss, skin warm dry and intact, MAEx4, lungs CTA, abd soft nondistended. Pt resting comfortably with VSS, no complaints at this time. Patient's bed in the lowest position, explained plan of care to patient and family members. Will continue to reassess.

## 2018-08-27 PROBLEM — K57.92 DIVERTICULITIS OF INTESTINE, PART UNSPECIFIED, WITHOUT PERFORATION OR ABSCESS WITHOUT BLEEDING: Chronic | Status: ACTIVE | Noted: 2017-06-09

## 2019-02-11 ENCOUNTER — EMERGENCY (EMERGENCY)
Facility: HOSPITAL | Age: 74
LOS: 1 days | Discharge: ANOTHER HOSPITAL | End: 2019-02-11
Attending: EMERGENCY MEDICINE
Payer: COMMERCIAL

## 2019-02-11 VITALS
TEMPERATURE: 97 F | HEART RATE: 75 BPM | RESPIRATION RATE: 17 BRPM | DIASTOLIC BLOOD PRESSURE: 74 MMHG | OXYGEN SATURATION: 99 % | SYSTOLIC BLOOD PRESSURE: 104 MMHG

## 2019-02-11 VITALS
TEMPERATURE: 98 F | RESPIRATION RATE: 18 BRPM | HEART RATE: 68 BPM | DIASTOLIC BLOOD PRESSURE: 72 MMHG | OXYGEN SATURATION: 99 % | SYSTOLIC BLOOD PRESSURE: 111 MMHG

## 2019-02-11 DIAGNOSIS — Z95.811 PRESENCE OF HEART ASSIST DEVICE: Chronic | ICD-10-CM

## 2019-02-11 LAB
ALBUMIN SERPL ELPH-MCNC: 4.6 G/DL — SIGNIFICANT CHANGE UP (ref 3.3–5)
ALP SERPL-CCNC: 63 U/L — SIGNIFICANT CHANGE UP (ref 40–120)
ALT FLD-CCNC: 13 U/L — SIGNIFICANT CHANGE UP (ref 10–45)
ANION GAP SERPL CALC-SCNC: 18 MMOL/L — HIGH (ref 5–17)
APTT BLD: 43.7 SEC — HIGH (ref 27.5–36.3)
AST SERPL-CCNC: 39 U/L — SIGNIFICANT CHANGE UP (ref 10–40)
BASOPHILS # BLD AUTO: 0.1 K/UL — SIGNIFICANT CHANGE UP (ref 0–0.2)
BASOPHILS NFR BLD AUTO: 1.2 % — SIGNIFICANT CHANGE UP (ref 0–2)
BILIRUB SERPL-MCNC: 0.5 MG/DL — SIGNIFICANT CHANGE UP (ref 0.2–1.2)
BUN SERPL-MCNC: 11 MG/DL — SIGNIFICANT CHANGE UP (ref 7–23)
CALCIUM SERPL-MCNC: 9.7 MG/DL — SIGNIFICANT CHANGE UP (ref 8.4–10.5)
CHLORIDE SERPL-SCNC: 95 MMOL/L — LOW (ref 96–108)
CO2 SERPL-SCNC: 25 MMOL/L — SIGNIFICANT CHANGE UP (ref 22–31)
CREAT SERPL-MCNC: 0.86 MG/DL — SIGNIFICANT CHANGE UP (ref 0.5–1.3)
EOSINOPHIL # BLD AUTO: 0.1 K/UL — SIGNIFICANT CHANGE UP (ref 0–0.5)
EOSINOPHIL NFR BLD AUTO: 1.7 % — SIGNIFICANT CHANGE UP (ref 0–6)
GAS PNL BLDV: SIGNIFICANT CHANGE UP
GLUCOSE SERPL-MCNC: 116 MG/DL — HIGH (ref 70–99)
HCT VFR BLD CALC: 37.2 % — SIGNIFICANT CHANGE UP (ref 34.5–45)
HGB BLD-MCNC: 12.5 G/DL — SIGNIFICANT CHANGE UP (ref 11.5–15.5)
INR BLD: 2.82 RATIO — HIGH (ref 0.88–1.16)
LIDOCAIN IGE QN: 26 U/L — SIGNIFICANT CHANGE UP (ref 7–60)
LYMPHOCYTES # BLD AUTO: 1.4 K/UL — SIGNIFICANT CHANGE UP (ref 1–3.3)
LYMPHOCYTES # BLD AUTO: 20.3 % — SIGNIFICANT CHANGE UP (ref 13–44)
MCHC RBC-ENTMCNC: 31.3 PG — SIGNIFICANT CHANGE UP (ref 27–34)
MCHC RBC-ENTMCNC: 33.5 GM/DL — SIGNIFICANT CHANGE UP (ref 32–36)
MCV RBC AUTO: 93.4 FL — SIGNIFICANT CHANGE UP (ref 80–100)
MONOCYTES # BLD AUTO: 0.8 K/UL — SIGNIFICANT CHANGE UP (ref 0–0.9)
MONOCYTES NFR BLD AUTO: 11.3 % — SIGNIFICANT CHANGE UP (ref 2–14)
NEUTROPHILS # BLD AUTO: 4.6 K/UL — SIGNIFICANT CHANGE UP (ref 1.8–7.4)
NEUTROPHILS NFR BLD AUTO: 65.5 % — SIGNIFICANT CHANGE UP (ref 43–77)
NT-PROBNP SERPL-SCNC: 1209 PG/ML — HIGH (ref 0–300)
PLATELET # BLD AUTO: 211 K/UL — SIGNIFICANT CHANGE UP (ref 150–400)
POTASSIUM SERPL-MCNC: 3.9 MMOL/L — SIGNIFICANT CHANGE UP (ref 3.5–5.3)
POTASSIUM SERPL-SCNC: 3.9 MMOL/L — SIGNIFICANT CHANGE UP (ref 3.5–5.3)
PROT SERPL-MCNC: 8.3 G/DL — SIGNIFICANT CHANGE UP (ref 6–8.3)
PROTHROM AB SERPL-ACNC: 33.2 SEC — HIGH (ref 10–12.9)
RBC # BLD: 3.98 M/UL — SIGNIFICANT CHANGE UP (ref 3.8–5.2)
RBC # FLD: 12.5 % — SIGNIFICANT CHANGE UP (ref 10.3–14.5)
SODIUM SERPL-SCNC: 138 MMOL/L — SIGNIFICANT CHANGE UP (ref 135–145)
TROPONIN T, HIGH SENSITIVITY RESULT: 20 NG/L — SIGNIFICANT CHANGE UP (ref 0–51)
WBC # BLD: 7.1 K/UL — SIGNIFICANT CHANGE UP (ref 3.8–10.5)
WBC # FLD AUTO: 7.1 K/UL — SIGNIFICANT CHANGE UP (ref 3.8–10.5)

## 2019-02-11 PROCEDURE — 85730 THROMBOPLASTIN TIME PARTIAL: CPT

## 2019-02-11 PROCEDURE — 82803 BLOOD GASES ANY COMBINATION: CPT

## 2019-02-11 PROCEDURE — 80053 COMPREHEN METABOLIC PANEL: CPT

## 2019-02-11 PROCEDURE — 84484 ASSAY OF TROPONIN QUANT: CPT

## 2019-02-11 PROCEDURE — 81001 URINALYSIS AUTO W/SCOPE: CPT

## 2019-02-11 PROCEDURE — 99284 EMERGENCY DEPT VISIT MOD MDM: CPT | Mod: 25

## 2019-02-11 PROCEDURE — 83690 ASSAY OF LIPASE: CPT

## 2019-02-11 PROCEDURE — 71045 X-RAY EXAM CHEST 1 VIEW: CPT | Mod: 26

## 2019-02-11 PROCEDURE — 74177 CT ABD & PELVIS W/CONTRAST: CPT

## 2019-02-11 PROCEDURE — 71045 X-RAY EXAM CHEST 1 VIEW: CPT

## 2019-02-11 PROCEDURE — 96374 THER/PROPH/DIAG INJ IV PUSH: CPT | Mod: XU

## 2019-02-11 PROCEDURE — 82947 ASSAY GLUCOSE BLOOD QUANT: CPT

## 2019-02-11 PROCEDURE — 93010 ELECTROCARDIOGRAM REPORT: CPT

## 2019-02-11 PROCEDURE — 85014 HEMATOCRIT: CPT

## 2019-02-11 PROCEDURE — 83605 ASSAY OF LACTIC ACID: CPT

## 2019-02-11 PROCEDURE — 83880 ASSAY OF NATRIURETIC PEPTIDE: CPT

## 2019-02-11 PROCEDURE — 83615 LACTATE (LD) (LDH) ENZYME: CPT

## 2019-02-11 PROCEDURE — 84295 ASSAY OF SERUM SODIUM: CPT

## 2019-02-11 PROCEDURE — 85610 PROTHROMBIN TIME: CPT

## 2019-02-11 PROCEDURE — 93005 ELECTROCARDIOGRAM TRACING: CPT

## 2019-02-11 PROCEDURE — 84132 ASSAY OF SERUM POTASSIUM: CPT

## 2019-02-11 PROCEDURE — 85027 COMPLETE CBC AUTOMATED: CPT

## 2019-02-11 PROCEDURE — 82330 ASSAY OF CALCIUM: CPT

## 2019-02-11 PROCEDURE — 74177 CT ABD & PELVIS W/CONTRAST: CPT | Mod: 26

## 2019-02-11 PROCEDURE — 82435 ASSAY OF BLOOD CHLORIDE: CPT

## 2019-02-11 RX ORDER — ACETAMINOPHEN 500 MG
975 TABLET ORAL ONCE
Qty: 0 | Refills: 0 | Status: COMPLETED | OUTPATIENT
Start: 2019-02-11 | End: 2019-02-11

## 2019-02-11 RX ORDER — ASPIRIN/CALCIUM CARB/MAGNESIUM 324 MG
81 TABLET ORAL ONCE
Refills: 0 | Status: COMPLETED | OUTPATIENT
Start: 2019-02-11 | End: 2019-02-11

## 2019-02-11 RX ORDER — PIPERACILLIN AND TAZOBACTAM 4; .5 G/20ML; G/20ML
3.38 INJECTION, POWDER, LYOPHILIZED, FOR SOLUTION INTRAVENOUS ONCE
Qty: 0 | Refills: 0 | Status: COMPLETED | OUTPATIENT
Start: 2019-02-11 | End: 2019-02-11

## 2019-02-11 RX ADMIN — Medication 81 MILLIGRAM(S): at 19:52

## 2019-02-11 RX ADMIN — Medication 975 MILLIGRAM(S): at 19:00

## 2019-02-11 RX ADMIN — Medication 975 MILLIGRAM(S): at 13:51

## 2019-02-11 RX ADMIN — PIPERACILLIN AND TAZOBACTAM 200 GRAM(S): 4; .5 INJECTION, POWDER, LYOPHILIZED, FOR SOLUTION INTRAVENOUS at 13:51

## 2019-02-11 NOTE — ED PROVIDER NOTE - PHYSICAL EXAMINATION
*GEN: NAD; well appearing; A+O x3   *HEAD: NC/AT   *EYES/NOSE: PERRL & EOMI b/l  *THROAT: airway patent, moist mucus membranes  *NECK: Neck supple, no masses  *PULMONARY: bibasilar rales  *CARDIAC: LVAD in place  *ABDOMEN:  mild LLQ ttp, NT, soft, no guarding, no rebound, no masses   *BACK: no CVA tenderness, Normal  spine   *EXTREMITIES: symmetric pulses, 2+ dp & radial pulses, capillary refill < 2 seconds, no cyanosis, no edema   *SKIN: no rash or bruising   *NEUROLOGIC: alert, CN 2-12 intact, moves all 4 extremeties, full active & passive ROM in all extremeties, normal baseline gait  *PSYCH: insight and judgment nl, memory nl, affect nl, thought nl

## 2019-02-11 NOTE — ED PROVIDER NOTE - CARE PLAN
Principal Discharge DX:	Left lower quadrant pain  Secondary Diagnosis:	LVAD (left ventricular assist device) present Principal Discharge DX:	Diverticulitis of intestine  Secondary Diagnosis:	LVAD (left ventricular assist device) present  Secondary Diagnosis:	Troponin I above reference range  Secondary Diagnosis:	Lactate blood increase

## 2019-02-11 NOTE — ED PROVIDER NOTE - OBJECTIVE STATEMENT
Pertinent PMH/PSH/FHx/SHx and Review of Systems contained within  73yoF PMH pertinent for  CHF s/p LVAD, Diverticulitis p/w CC abdominal pain x2day, intermittent LLQ. symptoms feel typical of pts diveritculitis. also c/o patel x2days.     ROS negative for: fever, Chest pain, SOB, Nausea, vomiting, diarrhea, dysuria, rectal bleeding,   FamilyHx and SocialHx not otherwise contributory Pertinent PMH/PSH/FHx/SHx and Review of Systems contained within  73yoF PMH pertinent for  CHF s/p LVAD, Diverticulitis p/w CC abdominal pain x2day, intermittent LLQ. symptoms feel typical of pts diveritculitis. also c/o patel x2days.   ROS negative for: fever, Chest pain, SOB, Nausea, vomiting, diarrhea, dysuria, rectal bleeding,   FamilyHx and SocialHx not otherwise contributory  cardio: dr lebron at [facility]

## 2019-02-11 NOTE — ED PROVIDER NOTE - PROGRESS NOTE DETAILS
viet lvad coordinator rec adm to [facility] if pt needs adm viet lvad coordinator [facility] rec adm to [facility] if pt needs adm aj: has diverticulitis, tolerating po, pain improved trop uptrending, 20to 72, no cp, ekg w/o acute ishchemic findings. spoke to eder lvad coordinator at [facility] who'll accept transfer under dr carlitos abbb Attending MD Contreras: Uptrending trop, patient to be transferred to [facility].  Patient informed.

## 2019-02-11 NOTE — ED ADULT NURSE REASSESSMENT NOTE - NS ED NURSE REASSESS COMMENT FT1
[id number] called to give report to Nicolas; was adv charge nurse refusing to take report; wants evening nurse to call report after [phone]; adv anderson Noguera

## 2019-02-11 NOTE — ED PROVIDER NOTE - SECONDARY DIAGNOSIS.
LVAD (left ventricular assist device) present Troponin I above reference range Lactate blood increase

## 2019-02-11 NOTE — ED ADULT NURSE NOTE - OBJECTIVE STATEMENT
73y f pt with LVAD c/o abd pain; pt states has hx of diverticulitis; pt denies chest pain and has had LVAD in place for 3 1/2 years; aox3; no resp distress; no chest pain; no sob; abd soft nondistended; 73y f pt with LVAD c/o abd pain; pt states has hx of diverticulitis; pt denies chest pain and has had LVAD in place for 3 1/2 years; aox3; no resp distress; no chest pain; no sob; abd soft nondistended; pt indicates LLQ painful x 2 days; pt states feels the same as when having a diverticulitis attack; pt denies fever/chills; no cough/congestion; pt denies hematuria/dysuria; no n/v/d; no blood in stool; iv placed; labs drawn; pt medicated per md order; LVAD # called Dede cantor LVAD not placed at [facility] wont be coming down; safety/comfort maintained

## 2019-02-11 NOTE — ED PROVIDER NOTE - MEDICAL DECISION MAKING DETAILS
pt w/ hx CHF s/p LVAD, Diverticulitis p/w LLQ abdominal pain, patel. will eval for diverticulitis, chf exacerbation, UTI pt w/ hx CHF s/p LVAD, Diverticulitis p/w LLQ abdominal pain, patel. will eval for diverticulitis, chf exacerbation, UTI  kory pt with lvad 3 yrs with llq pain cw previous divertic ,n o fever - tolerating po -- will give empiric abx, ct to eval for perf or abscess - lvad team paged and reeval

## 2019-02-11 NOTE — ED ADULT NURSE REASSESSMENT NOTE - NS ED NURSE REASSESS COMMENT FT1
LVAD team called as soon as pt placed in stretcher; s/w Dede; stated LVAD not placed at [facility]

## 2019-02-11 NOTE — ED ADULT NURSE NOTE - ED STAT RN HANDOFF DETAILS
report received from DDS RN, pt VSS, waiting for transport to [facility], resting comfortably in bed

## 2019-02-11 NOTE — ED ADULT NURSE REASSESSMENT NOTE - NS ED NURSE REASSESS COMMENT FT1
1835 Jamey from [facility] called to adv pt has a bed and to call Claudine RAYMONDW at [phone]

## 2019-02-11 NOTE — ED PROVIDER NOTE - NS ED ROS FT
Review of Systems:  	•	CONSTITUTIONAL: no fever  	•	SKIN: no rash  	•	RESPIRATORY: +BOWLES  	•	CARDIAC: no chest pain, no palpitations  	•	GI:  + abd pain, no nausea, no vomiting, no diarrhea  	•	GENITO-URINARY:  no dysuria; no hematuria    	•	MUSCULOSKELETAL:  no back pain  	•	NEUROLOGIC: no weakness  	•	ALLERGY: no rhinitis  	•	PSYSCHIATRIC: no anxiety

## 2019-02-11 NOTE — ED PROVIDER NOTE - SHIFT CHANGE DETAILS
pending ct - s/s highly suspicous for divertic , pending repeat lactate -- lvad team aware - will need admisison vs transfer to [facility] pending ct - s/s highly suspicous for divertic , pending repeat lactate -- lvad team aware - will need admisison vs transfer to [facility]    Attending MD Contreras: 73F with LVAD, abdominal pain with suspicion for diverticulitis, initial lactate 3.4, pending CT A/P, given Zosyn empirically because of Coumadin used, LVAD team is at [facility], will need admission, possible transfer, pending eval by our LVAD team

## 2019-07-18 ENCOUNTER — EMERGENCY (EMERGENCY)
Facility: HOSPITAL | Age: 74
LOS: 1 days | Discharge: HOME | End: 2019-07-18
Attending: EMERGENCY MEDICINE
Payer: COMMERCIAL

## 2019-07-18 VITALS
HEIGHT: 66 IN | RESPIRATION RATE: 18 BRPM | WEIGHT: 160.06 LBS | TEMPERATURE: 98 F | OXYGEN SATURATION: 97 % | HEART RATE: 75 BPM

## 2019-07-18 VITALS — HEART RATE: 68 BPM | RESPIRATION RATE: 16 BRPM | TEMPERATURE: 98 F | OXYGEN SATURATION: 100 %

## 2019-07-18 DIAGNOSIS — Z95.811 PRESENCE OF HEART ASSIST DEVICE: Chronic | ICD-10-CM

## 2019-07-18 LAB
ALBUMIN SERPL ELPH-MCNC: 4.1 G/DL — SIGNIFICANT CHANGE UP (ref 3.3–5)
ALP SERPL-CCNC: 67 U/L — SIGNIFICANT CHANGE UP (ref 40–120)
ALT FLD-CCNC: 5 U/L — LOW (ref 10–45)
ANION GAP SERPL CALC-SCNC: 15 MMOL/L — SIGNIFICANT CHANGE UP (ref 5–17)
AST SERPL-CCNC: 20 U/L — SIGNIFICANT CHANGE UP (ref 10–40)
BASOPHILS # BLD AUTO: 0 K/UL — SIGNIFICANT CHANGE UP (ref 0–0.2)
BASOPHILS NFR BLD AUTO: 0.2 % — SIGNIFICANT CHANGE UP (ref 0–2)
BILIRUB SERPL-MCNC: 0.3 MG/DL — SIGNIFICANT CHANGE UP (ref 0.2–1.2)
BUN SERPL-MCNC: 24 MG/DL — HIGH (ref 7–23)
CALCIUM SERPL-MCNC: 9.4 MG/DL — SIGNIFICANT CHANGE UP (ref 8.4–10.5)
CHLORIDE SERPL-SCNC: 96 MMOL/L — SIGNIFICANT CHANGE UP (ref 96–108)
CO2 SERPL-SCNC: 29 MMOL/L — SIGNIFICANT CHANGE UP (ref 22–31)
CREAT SERPL-MCNC: 1.28 MG/DL — SIGNIFICANT CHANGE UP (ref 0.5–1.3)
EOSINOPHIL # BLD AUTO: 0.1 K/UL — SIGNIFICANT CHANGE UP (ref 0–0.5)
EOSINOPHIL NFR BLD AUTO: 0.8 % — SIGNIFICANT CHANGE UP (ref 0–6)
GLUCOSE SERPL-MCNC: 120 MG/DL — HIGH (ref 70–99)
HCT VFR BLD CALC: 34 % — LOW (ref 34.5–45)
HGB BLD-MCNC: 11.6 G/DL — SIGNIFICANT CHANGE UP (ref 11.5–15.5)
LYMPHOCYTES # BLD AUTO: 1.4 K/UL — SIGNIFICANT CHANGE UP (ref 1–3.3)
LYMPHOCYTES # BLD AUTO: 20.7 % — SIGNIFICANT CHANGE UP (ref 13–44)
MAGNESIUM SERPL-MCNC: 2.1 MG/DL — SIGNIFICANT CHANGE UP (ref 1.6–2.6)
MCHC RBC-ENTMCNC: 31.4 PG — SIGNIFICANT CHANGE UP (ref 27–34)
MCHC RBC-ENTMCNC: 34 GM/DL — SIGNIFICANT CHANGE UP (ref 32–36)
MCV RBC AUTO: 92.3 FL — SIGNIFICANT CHANGE UP (ref 80–100)
MONOCYTES # BLD AUTO: 0.9 K/UL — SIGNIFICANT CHANGE UP (ref 0–0.9)
MONOCYTES NFR BLD AUTO: 12.9 % — SIGNIFICANT CHANGE UP (ref 2–14)
NEUTROPHILS # BLD AUTO: 4.4 K/UL — SIGNIFICANT CHANGE UP (ref 1.8–7.4)
NEUTROPHILS NFR BLD AUTO: 65.4 % — SIGNIFICANT CHANGE UP (ref 43–77)
PHOSPHATE SERPL-MCNC: 2.8 MG/DL — SIGNIFICANT CHANGE UP (ref 2.5–4.5)
PLATELET # BLD AUTO: 216 K/UL — SIGNIFICANT CHANGE UP (ref 150–400)
POTASSIUM SERPL-MCNC: 3.8 MMOL/L — SIGNIFICANT CHANGE UP (ref 3.5–5.3)
POTASSIUM SERPL-SCNC: 3.8 MMOL/L — SIGNIFICANT CHANGE UP (ref 3.5–5.3)
PROT SERPL-MCNC: 7.7 G/DL — SIGNIFICANT CHANGE UP (ref 6–8.3)
RBC # BLD: 3.69 M/UL — LOW (ref 3.8–5.2)
RBC # FLD: 12.6 % — SIGNIFICANT CHANGE UP (ref 10.3–14.5)
SODIUM SERPL-SCNC: 140 MMOL/L — SIGNIFICANT CHANGE UP (ref 135–145)
TROPONIN T, HIGH SENSITIVITY RESULT: 19 NG/L — SIGNIFICANT CHANGE UP (ref 0–51)
TROPONIN T, HIGH SENSITIVITY RESULT: 19 NG/L — SIGNIFICANT CHANGE UP (ref 0–51)
WBC # BLD: 6.8 K/UL — SIGNIFICANT CHANGE UP (ref 3.8–10.5)
WBC # FLD AUTO: 6.8 K/UL — SIGNIFICANT CHANGE UP (ref 3.8–10.5)

## 2019-07-18 PROCEDURE — 80053 COMPREHEN METABOLIC PANEL: CPT

## 2019-07-18 PROCEDURE — 99285 EMERGENCY DEPT VISIT HI MDM: CPT

## 2019-07-18 PROCEDURE — 71046 X-RAY EXAM CHEST 2 VIEWS: CPT

## 2019-07-18 PROCEDURE — 84484 ASSAY OF TROPONIN QUANT: CPT

## 2019-07-18 PROCEDURE — 71046 X-RAY EXAM CHEST 2 VIEWS: CPT | Mod: 26

## 2019-07-18 PROCEDURE — 99283 EMERGENCY DEPT VISIT LOW MDM: CPT | Mod: 25

## 2019-07-18 PROCEDURE — 83735 ASSAY OF MAGNESIUM: CPT

## 2019-07-18 PROCEDURE — 85027 COMPLETE CBC AUTOMATED: CPT

## 2019-07-18 PROCEDURE — 84100 ASSAY OF PHOSPHORUS: CPT

## 2019-07-18 PROCEDURE — 93010 ELECTROCARDIOGRAM REPORT: CPT

## 2019-07-18 PROCEDURE — 93005 ELECTROCARDIOGRAM TRACING: CPT

## 2019-07-18 NOTE — ED ADULT NURSE REASSESSMENT NOTE - NS ED NURSE REASSESS COMMENT FT1
Per Giselle with LVAD team, pt has earlier generation of LVAD that does not display info to input to flowsheet.

## 2019-07-18 NOTE — ED PROVIDER NOTE - PROGRESS NOTE DETAILS
Nik PGY-2:  received called from [facility], working on getting someone on LVAD team to call katie, awaiting call  [phone] | [phone] -> CT PA Nik PGY-2:  d/w Dr. Camarillo at [facility] part of LVAD team currant pt presentation, states pt can go home if currant workup negative and f/u w/ dr. yanez Nik PGY-2:  d/w Dr. Camarilol at [facility] part of LVAD team currant pt presentation, states pt can go home if currant workup negative and f/u w/ dr. Kat

## 2019-07-18 NOTE — ED PROVIDER NOTE - OBJECTIVE STATEMENT
73yo F PMH pertinent for CHF s/p LVAD pw cc of palpitations    Had palpitation during argument, lasted "longer then usual" and called EMS. Denies SOB, had light-headedness. Palpitations have resolved now.   No F/C.     meds: 73yo F PMH polymyalgia rheumatica CHF s/p LVAD pw cc of palpitations    Had palpitation during argument, lasted "longer then usual" and called EMS. Denies SOB, had light-headedness. Palpitations have resolved now.   No F/C.   LVAD is at [facility].    meds: Furosemide, aspirin, losartan, metoprolol, prednisolone, warfarin, losartan 73yo F PMH polymyalgia rheumatica CHF s/p LVAD pw cc of palpitations    Had palpitation during argument, lasted "longer then usual" and called EMS. Denies SOB, had light-headedness. Palpitations have resolved now.   No F/C, no CP.   LVAD is at [facility].  Has felt generally more SOB for past few months, LVAD settings were changed but only helped for two weeks, had echo two weeks ago at [facility] to further investigate has not had results back yet.   meds: Furosemide, aspirin, losartan, metoprolol, prednisolone, warfarin, losartan

## 2019-07-18 NOTE — ED PROVIDER NOTE - PHYSICAL EXAMINATION
General: well appearing, interactive, well nourished, NAD  HEENT: pupils equal and reactive, normal oropharynx, normal external ears bilaterally   Cardiac: Constant whirring apprieciated  Resp: lungs clear to auscultation bilaterally, symmetric chest wall rise  Abd: soft, nontender, nondistended, normoactive bowel sounds  : no CVA tenderness  Neuro: Moving all extremities  Skin:  normal color for race
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
## 2019-07-18 NOTE — ED PROVIDER NOTE - NSFOLLOWUPINSTRUCTIONS_ED_ALL_ED_FT
(1) Follow up with your primary care physician as discussed. In addition, we did not find evidence of a life threatening illness on your testing here today, but you have to follow up with Dr. Kat at [facility] as discussed.  (2) Immediately seek care at your nearest emergency room if your worsen, persist, or do not resolve   (3) Take Tylenol (up to 1000mg or 1 g)  and/or motrin (up to 600mg) up to every 6 hours as needed for pain.

## 2019-07-18 NOTE — ED PROVIDER NOTE - NS ED ROS FT
CONSTITUTIONAL: No fevers, no chills  Eyes: No vision changes  Cardiovascular: No Chest pain  Respiratory: No new SOB  Gastrointestinal: No n/v/d, no abd pain  Genitourinary: no dysuria, no hematuria  SKIN: no rashes.  NEURO: no headache, no weakness or numbness  PSYCHIATRIC: no known mental health issues.

## 2019-07-18 NOTE — ED ADULT NURSE NOTE - NSIMPLEMENTINTERV_GEN_ALL_ED
Implemented All Fall Risk Interventions:  [place] to call system. Call bell, personal items and telephone within reach. Instruct patient to call for assistance. Room bathroom lighting operational. Non-slip footwear when patient is off stretcher. Physically safe environment: no spills, clutter or unnecessary equipment. Stretcher in lowest position, wheels locked, appropriate side rails in place. Provide visual cue, wrist band, yellow gown, etc. Monitor gait and stability. Monitor for mental status changes and reorient to person, place, and time. Review medications for side effects contributing to fall risk. Reinforce activity limits and safety measures with patient and family.

## 2020-01-27 ENCOUNTER — EMERGENCY (EMERGENCY)
Facility: HOSPITAL | Age: 75
LOS: 1 days | Discharge: ANOTHER HOSPITAL | End: 2020-01-27
Attending: EMERGENCY MEDICINE
Payer: COMMERCIAL

## 2020-01-27 VITALS — TEMPERATURE: 99 F | HEART RATE: 92 BPM | WEIGHT: 147.05 LBS | HEIGHT: 66 IN | RESPIRATION RATE: 22 BRPM

## 2020-01-27 VITALS — OXYGEN SATURATION: 99 % | TEMPERATURE: 101 F | HEART RATE: 75 BPM | RESPIRATION RATE: 18 BRPM

## 2020-01-27 DIAGNOSIS — Z95.811 PRESENCE OF HEART ASSIST DEVICE: Chronic | ICD-10-CM

## 2020-01-27 LAB
ALBUMIN SERPL ELPH-MCNC: 4 G/DL — SIGNIFICANT CHANGE UP (ref 3.3–5)
ALP SERPL-CCNC: 67 U/L — SIGNIFICANT CHANGE UP (ref 40–120)
ALT FLD-CCNC: 5 U/L — LOW (ref 10–45)
ANION GAP SERPL CALC-SCNC: 17 MMOL/L — SIGNIFICANT CHANGE UP (ref 5–17)
ANISOCYTOSIS BLD QL: SLIGHT — SIGNIFICANT CHANGE UP
APPEARANCE UR: ABNORMAL
APTT BLD: 35.6 SEC — SIGNIFICANT CHANGE UP (ref 27.5–36.3)
AST SERPL-CCNC: 14 U/L — SIGNIFICANT CHANGE UP (ref 10–40)
BACTERIA # UR AUTO: NEGATIVE — SIGNIFICANT CHANGE UP
BASE EXCESS BLDV CALC-SCNC: 5.5 MMOL/L — HIGH (ref -2–2)
BASE EXCESS BLDV CALC-SCNC: 5.6 MMOL/L — HIGH (ref -2–2)
BASOPHILS # BLD AUTO: 0.05 K/UL — SIGNIFICANT CHANGE UP (ref 0–0.2)
BASOPHILS NFR BLD AUTO: 0.2 % — SIGNIFICANT CHANGE UP (ref 0–2)
BILIRUB SERPL-MCNC: 0.4 MG/DL — SIGNIFICANT CHANGE UP (ref 0.2–1.2)
BILIRUB UR-MCNC: NEGATIVE — SIGNIFICANT CHANGE UP
BLD GP AB SCN SERPL QL: NEGATIVE — SIGNIFICANT CHANGE UP
BUN SERPL-MCNC: 20 MG/DL — SIGNIFICANT CHANGE UP (ref 7–23)
BURR CELLS BLD QL SMEAR: PRESENT — SIGNIFICANT CHANGE UP
CA-I SERPL-SCNC: 1.2 MMOL/L — SIGNIFICANT CHANGE UP (ref 1.12–1.3)
CA-I SERPL-SCNC: 1.21 MMOL/L — SIGNIFICANT CHANGE UP (ref 1.12–1.3)
CALCIUM SERPL-MCNC: 9.7 MG/DL — SIGNIFICANT CHANGE UP (ref 8.4–10.5)
CHLORIDE BLDV-SCNC: 100 MMOL/L — SIGNIFICANT CHANGE UP (ref 96–108)
CHLORIDE BLDV-SCNC: 96 MMOL/L — SIGNIFICANT CHANGE UP (ref 96–108)
CHLORIDE SERPL-SCNC: 97 MMOL/L — SIGNIFICANT CHANGE UP (ref 96–108)
CO2 BLDV-SCNC: 32 MMOL/L — HIGH (ref 22–30)
CO2 BLDV-SCNC: 32 MMOL/L — HIGH (ref 22–30)
CO2 SERPL-SCNC: 27 MMOL/L — SIGNIFICANT CHANGE UP (ref 22–31)
COLOR SPEC: YELLOW — SIGNIFICANT CHANGE UP
CREAT SERPL-MCNC: 1.48 MG/DL — HIGH (ref 0.5–1.3)
DIFF PNL FLD: ABNORMAL
ELLIPTOCYTES BLD QL SMEAR: SLIGHT — SIGNIFICANT CHANGE UP
EOSINOPHIL # BLD AUTO: 0 K/UL — SIGNIFICANT CHANGE UP (ref 0–0.5)
EOSINOPHIL NFR BLD AUTO: 0 % — SIGNIFICANT CHANGE UP (ref 0–6)
EPI CELLS # UR: 0 /HPF — SIGNIFICANT CHANGE UP
GAS PNL BLDV: 140 MMOL/L — SIGNIFICANT CHANGE UP (ref 135–145)
GAS PNL BLDV: 141 MMOL/L — SIGNIFICANT CHANGE UP (ref 135–145)
GAS PNL BLDV: SIGNIFICANT CHANGE UP
GLUCOSE BLDV-MCNC: 121 MG/DL — HIGH (ref 70–99)
GLUCOSE BLDV-MCNC: 99 MG/DL — SIGNIFICANT CHANGE UP (ref 70–99)
GLUCOSE SERPL-MCNC: 121 MG/DL — HIGH (ref 70–99)
GLUCOSE UR QL: NEGATIVE — SIGNIFICANT CHANGE UP
HCO3 BLDV-SCNC: 31 MMOL/L — HIGH (ref 21–29)
HCO3 BLDV-SCNC: 31 MMOL/L — HIGH (ref 21–29)
HCT VFR BLD CALC: 30.3 % — LOW (ref 34.5–45)
HCT VFR BLDA CALC: 31 % — LOW (ref 39–50)
HCT VFR BLDA CALC: 31 % — LOW (ref 39–50)
HGB BLD CALC-MCNC: 10 G/DL — LOW (ref 11.5–15.5)
HGB BLD CALC-MCNC: 10.1 G/DL — LOW (ref 11.5–15.5)
HGB BLD-MCNC: 9.6 G/DL — LOW (ref 11.5–15.5)
HOROWITZ INDEX BLDV+IHG-RTO: SIGNIFICANT CHANGE UP
HYALINE CASTS # UR AUTO: 1 /LPF — SIGNIFICANT CHANGE UP (ref 0–2)
IMM GRANULOCYTES NFR BLD AUTO: 0.7 % — SIGNIFICANT CHANGE UP (ref 0–1.5)
INR BLD: 2.34 RATIO — HIGH (ref 0.88–1.16)
KETONES UR-MCNC: NEGATIVE — SIGNIFICANT CHANGE UP
LACTATE BLDV-MCNC: 1.6 MMOL/L — SIGNIFICANT CHANGE UP (ref 0.7–2)
LACTATE BLDV-MCNC: 2.5 MMOL/L — HIGH (ref 0.7–2)
LEUKOCYTE ESTERASE UR-ACNC: ABNORMAL
LIDOCAIN IGE QN: 8 U/L — SIGNIFICANT CHANGE UP (ref 7–60)
LYMPHOCYTES # BLD AUTO: 1.58 K/UL — SIGNIFICANT CHANGE UP (ref 1–3.3)
LYMPHOCYTES # BLD AUTO: 6.5 % — LOW (ref 13–44)
MANUAL SMEAR VERIFICATION: SIGNIFICANT CHANGE UP
MCHC RBC-ENTMCNC: 29.3 PG — SIGNIFICANT CHANGE UP (ref 27–34)
MCHC RBC-ENTMCNC: 31.7 GM/DL — LOW (ref 32–36)
MCV RBC AUTO: 92.4 FL — SIGNIFICANT CHANGE UP (ref 80–100)
MONOCYTES # BLD AUTO: 1.78 K/UL — HIGH (ref 0–0.9)
MONOCYTES NFR BLD AUTO: 7.3 % — SIGNIFICANT CHANGE UP (ref 2–14)
NEUTROPHILS # BLD AUTO: 20.71 K/UL — HIGH (ref 1.8–7.4)
NEUTROPHILS NFR BLD AUTO: 85.3 % — HIGH (ref 43–77)
NITRITE UR-MCNC: NEGATIVE — SIGNIFICANT CHANGE UP
NRBC # BLD: 0 /100 WBCS — SIGNIFICANT CHANGE UP (ref 0–0)
PCO2 BLDV: 49 MMHG — SIGNIFICANT CHANGE UP (ref 35–50)
PCO2 BLDV: 50 MMHG — SIGNIFICANT CHANGE UP (ref 35–50)
PH BLDV: 7.41 — SIGNIFICANT CHANGE UP (ref 7.35–7.45)
PH BLDV: 7.41 — SIGNIFICANT CHANGE UP (ref 7.35–7.45)
PH UR: 6 — SIGNIFICANT CHANGE UP (ref 5–8)
PLAT MORPH BLD: NORMAL — SIGNIFICANT CHANGE UP
PLATELET # BLD AUTO: 209 K/UL — SIGNIFICANT CHANGE UP (ref 150–400)
PO2 BLDV: <20 MMHG — LOW (ref 25–45)
PO2 BLDV: <20 MMHG — LOW (ref 25–45)
POIKILOCYTOSIS BLD QL AUTO: SLIGHT — SIGNIFICANT CHANGE UP
POTASSIUM BLDV-SCNC: 3 MMOL/L — LOW (ref 3.5–5.3)
POTASSIUM BLDV-SCNC: 3.1 MMOL/L — LOW (ref 3.5–5.3)
POTASSIUM SERPL-MCNC: 3.4 MMOL/L — LOW (ref 3.5–5.3)
POTASSIUM SERPL-SCNC: 3.4 MMOL/L — LOW (ref 3.5–5.3)
PROT SERPL-MCNC: 7.6 G/DL — SIGNIFICANT CHANGE UP (ref 6–8.3)
PROT UR-MCNC: ABNORMAL
PROTHROM AB SERPL-ACNC: 27.4 SEC — HIGH (ref 10–12.9)
RBC # BLD: 3.28 M/UL — LOW (ref 3.8–5.2)
RBC # FLD: 13.3 % — SIGNIFICANT CHANGE UP (ref 10.3–14.5)
RBC BLD AUTO: ABNORMAL
RBC CASTS # UR COMP ASSIST: 7 /HPF — HIGH (ref 0–4)
RH IG SCN BLD-IMP: POSITIVE — SIGNIFICANT CHANGE UP
SAO2 % BLDV: 16 % — LOW (ref 67–88)
SAO2 % BLDV: 18 % — LOW (ref 67–88)
SCHISTOCYTES BLD QL AUTO: SLIGHT — SIGNIFICANT CHANGE UP
SODIUM SERPL-SCNC: 141 MMOL/L — SIGNIFICANT CHANGE UP (ref 135–145)
SP GR SPEC: 1.02 — SIGNIFICANT CHANGE UP (ref 1.01–1.02)
UROBILINOGEN FLD QL: NEGATIVE — SIGNIFICANT CHANGE UP
WBC # BLD: 24.3 K/UL — HIGH (ref 3.8–10.5)
WBC # FLD AUTO: 24.3 K/UL — HIGH (ref 3.8–10.5)
WBC UR QL: 111 /HPF — HIGH (ref 0–5)

## 2020-01-27 PROCEDURE — 74176 CT ABD & PELVIS W/O CONTRAST: CPT

## 2020-01-27 PROCEDURE — 84295 ASSAY OF SERUM SODIUM: CPT

## 2020-01-27 PROCEDURE — 99285 EMERGENCY DEPT VISIT HI MDM: CPT | Mod: 25

## 2020-01-27 PROCEDURE — 86850 RBC ANTIBODY SCREEN: CPT

## 2020-01-27 PROCEDURE — 81001 URINALYSIS AUTO W/SCOPE: CPT

## 2020-01-27 PROCEDURE — 76937 US GUIDE VASCULAR ACCESS: CPT

## 2020-01-27 PROCEDURE — 85014 HEMATOCRIT: CPT

## 2020-01-27 PROCEDURE — 36000 PLACE NEEDLE IN VEIN: CPT

## 2020-01-27 PROCEDURE — 84132 ASSAY OF SERUM POTASSIUM: CPT

## 2020-01-27 PROCEDURE — 87040 BLOOD CULTURE FOR BACTERIA: CPT

## 2020-01-27 PROCEDURE — 82947 ASSAY GLUCOSE BLOOD QUANT: CPT

## 2020-01-27 PROCEDURE — 86900 BLOOD TYPING SEROLOGIC ABO: CPT

## 2020-01-27 PROCEDURE — 82330 ASSAY OF CALCIUM: CPT

## 2020-01-27 PROCEDURE — 85730 THROMBOPLASTIN TIME PARTIAL: CPT

## 2020-01-27 PROCEDURE — 85610 PROTHROMBIN TIME: CPT

## 2020-01-27 PROCEDURE — 80053 COMPREHEN METABOLIC PANEL: CPT

## 2020-01-27 PROCEDURE — 76937 US GUIDE VASCULAR ACCESS: CPT | Mod: 26

## 2020-01-27 PROCEDURE — 99291 CRITICAL CARE FIRST HOUR: CPT

## 2020-01-27 PROCEDURE — 96374 THER/PROPH/DIAG INJ IV PUSH: CPT | Mod: XU

## 2020-01-27 PROCEDURE — 82803 BLOOD GASES ANY COMBINATION: CPT

## 2020-01-27 PROCEDURE — 74176 CT ABD & PELVIS W/O CONTRAST: CPT | Mod: 26

## 2020-01-27 PROCEDURE — 85027 COMPLETE CBC AUTOMATED: CPT

## 2020-01-27 PROCEDURE — 83690 ASSAY OF LIPASE: CPT

## 2020-01-27 PROCEDURE — 82435 ASSAY OF BLOOD CHLORIDE: CPT

## 2020-01-27 PROCEDURE — 83605 ASSAY OF LACTIC ACID: CPT

## 2020-01-27 PROCEDURE — 86901 BLOOD TYPING SEROLOGIC RH(D): CPT

## 2020-01-27 RX ORDER — PIPERACILLIN AND TAZOBACTAM 4; .5 G/20ML; G/20ML
3.38 INJECTION, POWDER, LYOPHILIZED, FOR SOLUTION INTRAVENOUS ONCE
Refills: 0 | Status: COMPLETED | OUTPATIENT
Start: 2020-01-27 | End: 2020-01-27

## 2020-01-27 RX ORDER — SODIUM CHLORIDE 9 MG/ML
500 INJECTION INTRAMUSCULAR; INTRAVENOUS; SUBCUTANEOUS ONCE
Refills: 0 | Status: COMPLETED | OUTPATIENT
Start: 2020-01-27 | End: 2020-01-27

## 2020-01-27 RX ADMIN — SODIUM CHLORIDE 500 MILLILITER(S): 9 INJECTION INTRAMUSCULAR; INTRAVENOUS; SUBCUTANEOUS at 15:34

## 2020-01-27 RX ADMIN — PIPERACILLIN AND TAZOBACTAM 200 GRAM(S): 4; .5 INJECTION, POWDER, LYOPHILIZED, FOR SOLUTION INTRAVENOUS at 15:32

## 2020-01-27 RX ADMIN — SODIUM CHLORIDE 500 MILLILITER(S): 9 INJECTION INTRAMUSCULAR; INTRAVENOUS; SUBCUTANEOUS at 15:32

## 2020-01-27 NOTE — ED PROCEDURE NOTE - PROCEDURE ADDITIONAL DETAILS
POCUS: Emergency Department Focused Ultrasound performed at patient's bedside.  The complete report will be available in PACS.     20g JAG stuart v. POCUS:  Emergency Department Focused Ultrasound performed at patient's bedside.  The complete report will be available in PACS.     20g RUE; R narciso brown. POCUS:  Emergency Department Focused Ultrasound performed at patient's bedside.  The complete report will be available in PACS.     20g DARONE; R brachial v.

## 2020-01-27 NOTE — CONSULT NOTE ADULT - ASSESSMENT
74F with pmh CHF, s/p LVAD (6 years ago at [facility]), on Coumadin, GERD, Diverticulitis, presenting with constant, non-radiating, LLQ pain and CT demonstrating inflammation involving the sigmoid colon      - Patient is hemodynamically stable, no acute surgical intervention at this time  - Recommend Abx: Zosyn  - NPO/IVF  - Recommend transfer to [facility] for LVAD management  - Discussed with Dr. Tony Killian, PGY2  [facility]  [id number] with any questions

## 2020-01-27 NOTE — ED ADULT NURSE REASSESSMENT NOTE - NS ED NURSE REASSESS COMMENT FT1
Pts ultrasound line is not working, MD aware. Can not give IV fluids and need to obtain blood cultures and VBG.

## 2020-01-27 NOTE — ED ADULT NURSE REASSESSMENT NOTE - NS ED NURSE REASSESS COMMENT FT1
Called [facility] regarding pt transfer, spoke to Sravani who stated he thought the pt was supposed to be admitted, not to go to ER. Dr. Contreras spoke to Sravani on phone d/t ivis and Sravani at [facility] aware that pt is coming emergently via [facility] ambulance service since [facility] does not have adequate equipment available for this LVAD pt.

## 2020-01-27 NOTE — ED PROVIDER NOTE - ATTENDING CONTRIBUTION TO CARE
****ATTENDING**** 75yo f hx LVAD 5-6years ago at [facility], Diverticulitis presents with abd pain x 2 d. Pain is dull LLQ. + n/v x 1 yesterday. no diarrhea, decreased flatus. states symptoms are similar to prior diverticulitis. no f/c.   on exam, Patient is awake,alert,oriented x 3. Patient is well appearing and in no acute distress. Patient's chest is clear to ausculation, +s1s2. Abdomen is soft nd/+LLQ tender, no rebound or guading +BS. Extremity with no swelling or calf tenderness.   Check labs, CT to eval. ****ATTENDING**** 75yo f hx LVAD 5-6years ago at [facility], Diverticulitis presents with abd pain x 2 d. Pain is dull LLQ. + n/v x 1 yesterday. no diarrhea, decreased flatus. states symptoms are similar to prior diverticulitis. no f/c.   on exam, Patient is awake,alert,oriented x 3. Patient is well appearing and in no acute distress. Patient's chest is clear to ausculation, +s1s2. Abdomen is soft nd/+LLQ tender, no rebound or guarding +BS. Extremity with no swelling or calf tenderness.   Check labs, CT to eval.    Pt re evaluated multiple times, LVAD team notified, came down and MAP 70.   Patient will need transfer to [facility] pending CT and re eval.

## 2020-01-27 NOTE — CONSULT NOTE ADULT - SUBJECTIVE AND OBJECTIVE BOX
[facility] General Surgery Consultation     Patient is a 74y old  Female who presents with a chief complaint of Abdominal Pain (27 Jan 2020 15:40)      HPI:    74F with pmh CHF, s/p LVAD (6 years ago at [facility]), on Coumadin, GERD, Diverticulitis, presenting with constant, non-radiating, LLQ pain since yesterday. Reports pain very similar to diverticulitis in the past. Reports she took a laxative and had BM, last BM this morning at 4am. Reports stool is always dark 2/2 iron supplements. No BRBPR. Reports one episode of NBNB vomiting.  Denies fever/chills, CP, SOB, diarrhea, urinary symptoms, LE pain/swelling. Patient was seen by surgery in 2017 for complicated diverticulitis with abscess not amenable to drainage by IR, was treated conservatively, Patient also had colonoscopy 1 year ago, reports 2 polyps were removed, no other concerning findings. In ED, patient had CT demonstrating inflammation involving the sigmoid colon and left adnexa versus or diverticulitis. Suspected extraluminal air and fluid. Surgery consulted for evaluation.      PAST MEDICAL & SURGICAL HISTORY:  Diverticulitis  CHF (congestive heart failure)  Myocardial infarction  GERD (gastroesophageal reflux disease)  Polymyalgia rheumatica  Fibromyalgia  Acid reflux  LVAD (left ventricular assist device) present  No significant past surgical history      FAMILY HISTORY:  No pertinent family history in first degree relatives      SOCIAL HISTORY:    MEDICATIONS  (STANDING):    MEDICATIONS  (PRN):    Allergies    No Known Allergies    Intolerances        Vital Signs Last 24 Hrs  T(C): 37.3 (27 Jan 2020 16:11), Max: 37.3 (27 Jan 2020 16:11)  T(F): 99.1 (27 Jan 2020 16:11), Max: 99.1 (27 Jan 2020 16:11)  HR: 79 (27 Jan 2020 16:11) (79 - 92)  BP: --  BP(mean): 70 (27 Jan 2020 13:16) (70 - 70)  RR: 18 (27 Jan 2020 16:11) (16 - 22)  SpO2: 98% (27 Jan 2020 16:11) (97% - 98%)  Daily Height in cm: 167.64 (27 Jan 2020 10:49)    Daily     General: NAD, well-nourished  HEENT: Atraumatic, EOMI  Resp: Breathing comfortably on RA  CV: Normal sinus rhythm  Abd: soft, ND, focally tender in RLQ, no RT, no guarding  Ext: ROMIx4, motor strength intact x 4                          9.6    24.30 )-----------( 209      ( 27 Jan 2020 12:40 )             30.3     01-27    141  |  97  |  20  ----------------------------<  121<H>  3.4<L>   |  27  |  1.48<H>    Ca    9.7      27 Jan 2020 12:40    TPro  7.6  /  Alb  4.0  /  TBili  0.4  /  DBili  x   /  AST  14  /  ALT  5<L>  /  AlkPhos  67  01-27    PT/INR - ( 27 Jan 2020 12:40 )   PT: 27.4 sec;   INR: 2.34 ratio         PTT - ( 27 Jan 2020 12:40 )  PTT:35.6 sec      Radiographic Findings:         EXAM:  CT ABDOMEN AND PELVIS OC                            PROCEDURE DATE:  01/27/2020            INTERPRETATION:  CLINICAL INFORMATION: Abdominal pain and left lower quadrant tenderness.    COMPARISON: CT abdomen and pelvis 2/11/2019.    PROCEDURE:   CT of the Abdomen and Pelvis was performed without intravenous contrast.   Intravenous contrast: None.  Oral contrast: positive contrast was administered.  Sagittal and coronal reformats were performed.    FINDINGS:    LOWER CHEST: Partially imaged left ventricular assist device and median sternotomy. Scattered areas of tree-in-bud opacities likely representing mucoid impacted airways.    Evaluation of the solid organs and vasculature is limited without intravenous contrast. Evaluation is alsolimited secondary to streak artifact from patient's left ventricular assist device.    LIVER: Within normal limits.  BILE DUCTS: Normal caliber.  GALLBLADDER: Within normal limits.  SPLEEN: Within normal limits.  PANCREAS: Within normal limits.  ADRENALS: Within normal limits.  KIDNEYS/URETERS: No hydronephrosis.    BLADDER: Within normal limits.  REPRODUCTIVE ORGANS: Uterus within normal limits. Left adnexa is inseparable from the inflammatory process involving the sigmoid colon.    BOWEL: Colonic diverticulosis with extensive wall thickening and surrounding inflammatory change involving the sigmoid colon involving the left adnexa. Evaluation is limited without intravenous contrast.  Several foci of air suspected to be extraluminal (2:92). No bowel obstruction. Appendix is normal.  PERITONEUM: Extensive inflammatory changes involving the left adnexa and sigmoid colon with adjacent suspected extraluminal air and fluid. Evaluation is limited without intravenous contrast.  VESSELS: Atherosclerotic disease of the abdominal aorta.  RETROPERITONEUM/LYMPH NODES: No lymphadenopathy.    ABDOMINAL WALL: Within normal limits.  BONES: Mild degenerative changes of the spine.    IMPRESSION:     Inflammation involving the sigmoid colon and left adnexa versus or diverticulitis. Suspected extraluminal air and fluid. Evaluation is limited without intravenous contrast.    Consider correlation with colonoscopy to exclude underlying mass once acute symptoms have resolved.                        SELENE HARRINGTON, ATTENDING RADIOLOGIST  This document has been electronically signed. Jan 27 2020  2:34PM

## 2020-01-27 NOTE — ED PROVIDER NOTE - OBJECTIVE STATEMENT
73yo F with PMH 75yo F with PMH CHF, s/p LVAD (6 years ago at [facility]), on coumadin, GERD, Diverticulitis, presenting with constant, nonradiating, LLQ pain since yesterday. Reports pain very similar to diverticulitis in the past. Reports she took a laxative and had BM, last BM this morning at 4am. Reports stool is always dark 2/2 iron supplements. No BRBPR. +decreased flatulence. Reports 1 episode of nb/nb vomiting.  Denies fever/chills, CP, SOB, diarrhea, urinary symptoms, LE pain/swelling.

## 2020-01-27 NOTE — ED PROVIDER NOTE - NSFOLLOWUPINSTRUCTIONS_ED_ALL_ED_FT
Please follow up with your doctors at [facility] for further management of your diverticulitis and LVAD.

## 2020-01-27 NOTE — ED PROCEDURE NOTE - PROCEDURE ADDITIONAL DETAILS
POCUS: Emergency Department Focused Ultrasound performed at patient's bedside.  The complete report may be available in PACS, see below for findings. POCUS: Emergency Department Focused Ultrasound performed at patient's bedside.  The complete report may be available in PACS, see below for findings.  Right brachial vein with a 20 gauge angiocatheter.

## 2020-01-27 NOTE — ED ADULT NURSE NOTE - OBJECTIVE STATEMENT
75 y/o female c/o LLQ abd pain x2 days. Pt states she had diverticulitis last March and this feels very similar. Describes pain as sharp and crampy, intermittent and does not radiate. Took a laxative yesterday and had two BMs yesterday, one today. One episode of vomiting yesterday. Denies blood in stool or vomit, diarrhea or constipation, urinary symptoms, headache, chills, cough, chest pain, SOB, bloating, falls. PMH of LVAD, GERD, MI, CHF. A&Ox4, breath sounds clear bilaterally, no abd distention but abd is tender to LLQ, hypoactive bowel sounds, skin warm dry and intact, ambulatory and able to move all extremities. LVAD team at bedside, pt has spare batteries, dressing around LVAD site on pt is dry and intact. To obtain blood pressure MAP using doppler. 2 RNs attempted IV insertion with no success, MD to attempt ultrasound IV line. Son at bedside.

## 2020-01-27 NOTE — ED PROVIDER NOTE - SHIFT CHANGE DETAILS
Attending MD Contreras: LVAD (Man), tic last year, LLQ pain now, MAP 70, LVAD team seen, WBC 24, s/p Zosyn, pending sx for L adnexa/inflammation/extraluminal air and fluid, LVAD updated

## 2020-01-27 NOTE — PROGRESS NOTE ADULT - SUBJECTIVE AND OBJECTIVE BOX
LVAD Coordinator Note:        Called this am [employer] that patient coming in from home. She is a 74 yr old black female who was implanted with her LVAD in 2013 at [facility].  I spoke to her LVAD Coordinator at [facility] and we discussed if admission needed patient would be transferred to [facility].  I went to the ER and patient is well looking only c/o pain with palpation of abdomen she states this started about 2 days ago and she has been on a self imposed liquid diet for the 2 days.  We are unable to assess her VAD numbers at this time as she has an EPC controller.  Her MAP is 70 and she states she has not taken any medicine today.    Spoke with our staff in the ER re: admission should be sent to [facility].  Any further questions do not hesitate to call [phone]

## 2020-01-27 NOTE — ED PROVIDER NOTE - PROGRESS NOTE DETAILS
LVAD TOYIN Whittington at pt's bedside in ER, if patient admitted only available beds are on [facility]. - Halie Jaffe PA-C Spoke w Spoke with TOYIN Whittington regarding unable to obtain BP/pulse with doppler. Will come down to ER with clinic doppler. - Halie Jaffe PA-C CT concerning for possible perf diverticulitis, surgery consulted. Abx ordered. LVAD TOYIN Whittington aware and updated on CT results - Halie Jaffe PA-C Surgery recommending patient be transferred to [facility]. LVAD NP Natasha ho.  Msg left for [facility] lvad coordinator at [facility] [phone]. - Halie Jaffe PA-C Surgery recommending patient be transferred to [facility]. LVAD NP Natasha ho. Msg left for [facility] LVAD coordinator at [facility] [phone]. - Halie Jaffe PA-C Spoke with Nena, out of office, number to hot line given: [phone]. Called and gave patient information. Awaiting call back. - Halie Jaffe PA-C Called back by Cecile from [facility] LVAD hotline ([phone]), will check for bed and call back with transfer details. - Halie Jaffe PA-C Surgery recommending patient be transferred to [facility]. LVAD NP Nelsy ho. Msg left for [facility] LVAD coordinator at [facility] [phone]. - Halie Jaffe PA-C Called back by both Cecile and Nelsy, advising that patient call her son to have her equipment brought to her while in ED, and reporting that day-time coordinator at [facility] did not pass info along regarding patient, and now currently unclear how long until bed available. Pt has one full battery with her to be switched very soon, but reports her son borrowed a car to drive her to ER and does not have a way to get her equipment to her.  Patient understands importance of getting this equipment. Escalated to ED Admin Dr. Johnson and advised ER to ER transfer be initiated. Nelsy called by Dr. Contreras and made aware of plan. - Halie Jaffe PA-C Dr. Contreras [facility] transfer center now. - LENI GutierrezC Attending MD Contreras: Notified by PAWAN Jaffe that patient changing her last battery now (12 hours once changed).  Discussed with Nelsy ([facility] LVAD coordinator) extensive attempts at obtaining equipment, there is NO equipment available at [facility] for this patient's LVAD.  Discussed with Cecile ([phone] - LVAD coordinator at [facility]) and Dr. Nolasco (ED attending at [facility]).  Explained that patient is on their last battery and we have no life sustaining equipment at [facility].  Understand that Cecile has requested a bed at [facility] but reports that if patient goes to ED they will have equipment for her.  Instructed Dr. Nolasco to just call CT sx PA once patient arrives there.  Rick at [place] transfer [place] [phone] coordinator this.  CHRIST Beavers called to give report, this MD spoke with Eloise at [facility] transfer center who reports that his supervisor has ok'ed transfer to [facility]. Called back by both Cecile and Nelsy, advising that patient call her son to have her equipment brought to her while in ED, and reporting that day-time coordinator at [facility] did not pass info along regarding patient, and now currently unclear how long until bed available. Pt has one full battery with her to be switched very soon, but reports her son borrowed a car to drive her to ER and does not have a way to get her equipment to her.  Patient understands importance of getting this equipment. Escalated to ED Admin Dr. Johnson and advised ER to ER transfer be initiated. Nelsy called by Dr. Contreras and made aware of plan. Patient remains well appearing, VSS, and in agreement with transfer. - Halie Jaffe PA-C

## 2020-01-27 NOTE — ED PROCEDURE NOTE - ATTENDING CONTRIBUTION TO CARE
***Joaquin Winkler MD FACEP*** Attending physician was available for the key components of the procedure, the patient tolerated well. There were no complications with the procedure.
***Joaquin Winkler MD FACEP*** Attending physician was available for the key components of the procedure, the patient tolerated well. There were no complications with the procedure.

## 2020-01-27 NOTE — ED PROCEDURE NOTE - NS ED ATTENDING STATEMENT MOD
I have personally performed a face to face diagnostic evaluation on this patient. I have reviewed the ACP note and agree with the history, exam and plan of care, except as noted.
I have personally seen and examined this patient.  I have fully participated in the care of this patient. I have reviewed all pertinent clinical information, including history, physical exam, plan and the Resident’s note and agree except as noted.

## 2020-02-01 LAB
CULTURE RESULTS: SIGNIFICANT CHANGE UP
CULTURE RESULTS: SIGNIFICANT CHANGE UP
SPECIMEN SOURCE: SIGNIFICANT CHANGE UP
SPECIMEN SOURCE: SIGNIFICANT CHANGE UP

## 2020-02-22 NOTE — H&P ADULT - ATTENDING COMMENTS
no
71F with LVAD on Coumadin presents with acute diverticulitis and 3cm cesar-colonic abscess. had a first episode of acute diverticulitis a few months ago that resolved with ABx. Patient reports a normal colonoscopy in 1/2017.  -consult IR for percutaneous drainage  -consult heart failure team for LVAD management

## 2020-05-21 ENCOUNTER — INPATIENT (INPATIENT)
Facility: HOSPITAL | Age: 75
LOS: 0 days | DRG: 297 | End: 2020-05-21
Attending: THORACIC SURGERY (CARDIOTHORACIC VASCULAR SURGERY) | Admitting: THORACIC SURGERY (CARDIOTHORACIC VASCULAR SURGERY)
Payer: COMMERCIAL

## 2020-05-21 VITALS
DIASTOLIC BLOOD PRESSURE: 51 MMHG | RESPIRATION RATE: 16 BRPM | SYSTOLIC BLOOD PRESSURE: 75 MMHG | HEART RATE: 73 BPM | OXYGEN SATURATION: 99 %

## 2020-05-21 VITALS
OXYGEN SATURATION: 100 % | DIASTOLIC BLOOD PRESSURE: 61 MMHG | RESPIRATION RATE: 24 BRPM | HEART RATE: 125 BPM | SYSTOLIC BLOOD PRESSURE: 77 MMHG

## 2020-05-21 DIAGNOSIS — Z95.811 PRESENCE OF HEART ASSIST DEVICE: Chronic | ICD-10-CM

## 2020-05-21 DIAGNOSIS — Z95.811 PRESENCE OF HEART ASSIST DEVICE: ICD-10-CM

## 2020-05-21 LAB
ALBUMIN SERPL ELPH-MCNC: 3.2 G/DL — LOW (ref 3.3–5)
ALP SERPL-CCNC: 97 U/L — SIGNIFICANT CHANGE UP (ref 40–120)
ALT FLD-CCNC: 73 U/L — HIGH (ref 10–45)
ANION GAP SERPL CALC-SCNC: 13 MMOL/L — SIGNIFICANT CHANGE UP (ref 5–17)
APTT BLD: 53.4 SEC — HIGH (ref 27.5–36.3)
AST SERPL-CCNC: 179 U/L — HIGH (ref 10–40)
BASOPHILS # BLD AUTO: 0 K/UL — SIGNIFICANT CHANGE UP (ref 0–0.2)
BASOPHILS NFR BLD AUTO: 0 % — SIGNIFICANT CHANGE UP (ref 0–2)
BILIRUB SERPL-MCNC: 0.1 MG/DL — LOW (ref 0.2–1.2)
BUN SERPL-MCNC: 9 MG/DL — SIGNIFICANT CHANGE UP (ref 7–23)
CALCIUM SERPL-MCNC: 9.6 MG/DL — SIGNIFICANT CHANGE UP (ref 8.4–10.5)
CHLORIDE SERPL-SCNC: 109 MMOL/L — HIGH (ref 96–108)
CO2 SERPL-SCNC: 13 MMOL/L — LOW (ref 22–31)
CREAT SERPL-MCNC: 1.15 MG/DL — SIGNIFICANT CHANGE UP (ref 0.5–1.3)
EOSINOPHIL # BLD AUTO: 0 K/UL — SIGNIFICANT CHANGE UP (ref 0–0.5)
EOSINOPHIL NFR BLD AUTO: 0 % — SIGNIFICANT CHANGE UP (ref 0–6)
GAS PNL BLDA: SIGNIFICANT CHANGE UP
GAS PNL BLDV: SIGNIFICANT CHANGE UP
GLUCOSE BLDC GLUCOMTR-MCNC: 188 MG/DL — HIGH (ref 70–99)
GLUCOSE SERPL-MCNC: 257 MG/DL — HIGH (ref 70–99)
HCT VFR BLD CALC: 35.2 % — SIGNIFICANT CHANGE UP (ref 34.5–45)
HGB BLD-MCNC: 10.5 G/DL — LOW (ref 11.5–15.5)
INR BLD: 3.29 RATIO — HIGH (ref 0.88–1.16)
LYMPHOCYTES # BLD AUTO: 10 % — LOW (ref 13–44)
LYMPHOCYTES # BLD AUTO: 2.41 K/UL — SIGNIFICANT CHANGE UP (ref 1–3.3)
MAGNESIUM SERPL-MCNC: 2.6 MG/DL — SIGNIFICANT CHANGE UP (ref 1.6–2.6)
MCHC RBC-ENTMCNC: 29.8 GM/DL — LOW (ref 32–36)
MCHC RBC-ENTMCNC: 30.3 PG — SIGNIFICANT CHANGE UP (ref 27–34)
MCV RBC AUTO: 101.7 FL — HIGH (ref 80–100)
MONOCYTES # BLD AUTO: 0.48 K/UL — SIGNIFICANT CHANGE UP (ref 0–0.9)
MONOCYTES NFR BLD AUTO: 2 % — SIGNIFICANT CHANGE UP (ref 2–14)
NEUTROPHILS # BLD AUTO: 20.68 K/UL — HIGH (ref 1.8–7.4)
NEUTROPHILS NFR BLD AUTO: 79 % — HIGH (ref 43–77)
PLATELET # BLD AUTO: 251 K/UL — SIGNIFICANT CHANGE UP (ref 150–400)
POTASSIUM SERPL-MCNC: 7.9 MMOL/L — CRITICAL HIGH (ref 3.5–5.3)
POTASSIUM SERPL-SCNC: 7.9 MMOL/L — CRITICAL HIGH (ref 3.5–5.3)
PROT SERPL-MCNC: 5.9 G/DL — LOW (ref 6–8.3)
PROTHROM AB SERPL-ACNC: 38.9 SEC — HIGH (ref 10–12.9)
RBC # BLD: 3.46 M/UL — LOW (ref 3.8–5.2)
RBC # FLD: 15.9 % — HIGH (ref 10.3–14.5)
SARS-COV-2 RNA SPEC QL NAA+PROBE: SIGNIFICANT CHANGE UP
SODIUM SERPL-SCNC: 135 MMOL/L — SIGNIFICANT CHANGE UP (ref 135–145)
TROPONIN T, HIGH SENSITIVITY RESULT: 222 NG/L — HIGH (ref 0–51)
WBC # BLD: 24.05 K/UL — HIGH (ref 3.8–10.5)
WBC # FLD AUTO: 24.05 K/UL — HIGH (ref 3.8–10.5)

## 2020-05-21 PROCEDURE — 36620 INSERTION CATHETER ARTERY: CPT

## 2020-05-21 PROCEDURE — 80053 COMPREHEN METABOLIC PANEL: CPT

## 2020-05-21 PROCEDURE — 82947 ASSAY GLUCOSE BLOOD QUANT: CPT

## 2020-05-21 PROCEDURE — 85730 THROMBOPLASTIN TIME PARTIAL: CPT

## 2020-05-21 PROCEDURE — 93005 ELECTROCARDIOGRAM TRACING: CPT | Mod: XU

## 2020-05-21 PROCEDURE — 51702 INSERT TEMP BLADDER CATH: CPT | Mod: XU

## 2020-05-21 PROCEDURE — 94002 VENT MGMT INPAT INIT DAY: CPT

## 2020-05-21 PROCEDURE — 85014 HEMATOCRIT: CPT

## 2020-05-21 PROCEDURE — 36555 INSERT NON-TUNNEL CV CATH: CPT

## 2020-05-21 PROCEDURE — 82565 ASSAY OF CREATININE: CPT

## 2020-05-21 PROCEDURE — C1751: CPT

## 2020-05-21 PROCEDURE — 31500 INSERT EMERGENCY AIRWAY: CPT

## 2020-05-21 PROCEDURE — 84484 ASSAY OF TROPONIN QUANT: CPT

## 2020-05-21 PROCEDURE — 82330 ASSAY OF CALCIUM: CPT

## 2020-05-21 PROCEDURE — 71045 X-RAY EXAM CHEST 1 VIEW: CPT

## 2020-05-21 PROCEDURE — 99291 CRITICAL CARE FIRST HOUR: CPT | Mod: 25

## 2020-05-21 PROCEDURE — 82803 BLOOD GASES ANY COMBINATION: CPT

## 2020-05-21 PROCEDURE — 71045 X-RAY EXAM CHEST 1 VIEW: CPT | Mod: 26

## 2020-05-21 PROCEDURE — 83605 ASSAY OF LACTIC ACID: CPT

## 2020-05-21 PROCEDURE — 84295 ASSAY OF SERUM SODIUM: CPT

## 2020-05-21 PROCEDURE — 87040 BLOOD CULTURE FOR BACTERIA: CPT

## 2020-05-21 PROCEDURE — 85610 PROTHROMBIN TIME: CPT

## 2020-05-21 PROCEDURE — 83735 ASSAY OF MAGNESIUM: CPT

## 2020-05-21 PROCEDURE — 93010 ELECTROCARDIOGRAM REPORT: CPT | Mod: 59

## 2020-05-21 PROCEDURE — 84132 ASSAY OF SERUM POTASSIUM: CPT

## 2020-05-21 PROCEDURE — 43753 TX GASTRO INTUB W/ASP: CPT

## 2020-05-21 PROCEDURE — 82962 GLUCOSE BLOOD TEST: CPT

## 2020-05-21 PROCEDURE — 82435 ASSAY OF BLOOD CHLORIDE: CPT

## 2020-05-21 PROCEDURE — 85027 COMPLETE CBC AUTOMATED: CPT

## 2020-05-21 PROCEDURE — 36556 INSERT NON-TUNNEL CV CATH: CPT

## 2020-05-21 RX ORDER — FUROSEMIDE 40 MG
40 TABLET ORAL ONCE
Refills: 0 | Status: COMPLETED | OUTPATIENT
Start: 2020-05-21 | End: 2020-05-21

## 2020-05-21 RX ORDER — CALCIUM GLUCONATE 100 MG/ML
1 VIAL (ML) INTRAVENOUS ONCE
Refills: 0 | Status: COMPLETED | OUTPATIENT
Start: 2020-05-21 | End: 2020-05-21

## 2020-05-21 RX ORDER — VANCOMYCIN HCL 1 G
1000 VIAL (EA) INTRAVENOUS ONCE
Refills: 0 | Status: DISCONTINUED | OUTPATIENT
Start: 2020-05-21 | End: 2020-05-21

## 2020-05-21 RX ORDER — DEXTROSE 50 % IN WATER 50 %
25 SYRINGE (ML) INTRAVENOUS ONCE
Refills: 0 | Status: COMPLETED | OUTPATIENT
Start: 2020-05-21 | End: 2020-05-21

## 2020-05-21 RX ORDER — INSULIN HUMAN 100 [IU]/ML
10 INJECTION, SOLUTION SUBCUTANEOUS ONCE
Refills: 0 | Status: COMPLETED | OUTPATIENT
Start: 2020-05-21 | End: 2020-05-21

## 2020-05-21 RX ORDER — ETOMIDATE 2 MG/ML
20 INJECTION INTRAVENOUS ONCE
Refills: 0 | Status: DISCONTINUED | OUTPATIENT
Start: 2020-05-21 | End: 2020-05-21

## 2020-05-21 RX ORDER — CHLORHEXIDINE GLUCONATE 213 G/1000ML
15 SOLUTION TOPICAL EVERY 12 HOURS
Refills: 0 | Status: DISCONTINUED | OUTPATIENT
Start: 2020-05-21 | End: 2020-05-21

## 2020-05-21 RX ORDER — SODIUM ZIRCONIUM CYCLOSILICATE 10 G/10G
10 POWDER, FOR SUSPENSION ORAL ONCE
Refills: 0 | Status: DISCONTINUED | OUTPATIENT
Start: 2020-05-21 | End: 2020-05-21

## 2020-05-21 RX ORDER — NOREPINEPHRINE BITARTRATE/D5W 8 MG/250ML
0.05 PLASTIC BAG, INJECTION (ML) INTRAVENOUS
Qty: 8 | Refills: 0 | Status: DISCONTINUED | OUTPATIENT
Start: 2020-05-21 | End: 2020-05-21

## 2020-05-21 RX ORDER — SODIUM BICARBONATE 1 MEQ/ML
50 SYRINGE (ML) INTRAVENOUS ONCE
Refills: 0 | Status: COMPLETED | OUTPATIENT
Start: 2020-05-21 | End: 2020-05-21

## 2020-05-21 RX ORDER — VASOPRESSIN 20 [USP'U]/ML
0.04 INJECTION INTRAVENOUS
Qty: 50 | Refills: 0 | Status: DISCONTINUED | OUTPATIENT
Start: 2020-05-21 | End: 2020-05-21

## 2020-05-21 RX ORDER — CEFEPIME 1 G/1
2000 INJECTION, POWDER, FOR SOLUTION INTRAMUSCULAR; INTRAVENOUS ONCE
Refills: 0 | Status: COMPLETED | OUTPATIENT
Start: 2020-05-21 | End: 2020-05-21

## 2020-05-21 RX ORDER — DOBUTAMINE HCL 250MG/20ML
2.5 VIAL (ML) INTRAVENOUS
Qty: 1000 | Refills: 0 | Status: DISCONTINUED | OUTPATIENT
Start: 2020-05-21 | End: 2020-05-21

## 2020-05-21 RX ADMIN — Medication 40 MILLIGRAM(S): at 22:45

## 2020-05-21 RX ADMIN — Medication 2.25 MICROGRAM(S)/KG/MIN: at 23:34

## 2020-05-21 RX ADMIN — Medication 25 GRAM(S): at 22:54

## 2020-05-21 RX ADMIN — Medication 50 MILLIEQUIVALENT(S): at 22:55

## 2020-05-21 RX ADMIN — Medication 50 MILLIEQUIVALENT(S): at 23:32

## 2020-05-21 RX ADMIN — INSULIN HUMAN 10 UNIT(S): 100 INJECTION, SOLUTION SUBCUTANEOUS at 22:45

## 2020-05-21 RX ADMIN — CEFEPIME 100 MILLIGRAM(S): 1 INJECTION, POWDER, FOR SOLUTION INTRAMUSCULAR; INTRAVENOUS at 22:58

## 2020-05-21 RX ADMIN — Medication 6.56 MICROGRAM(S)/KG/MIN: at 23:33

## 2020-05-21 NOTE — ED PROVIDER NOTE - CRITICAL CARE PROVIDED
direct patient care (not related to procedure)/interpretation of diagnostic studies/consultation with other physicians/consult w/ pt's family directly relating to pts condition/documentation

## 2020-05-21 NOTE — ED ADULT NURSE REASSESSMENT NOTE - NS ED NURSE REASSESS COMMENT FT1
Cheng placed for urine output monitoring under sterile technique with 2 RNs at bedside. 10 ccs of yellow urine drained from Cheng.

## 2020-05-21 NOTE — ED ADULT NURSE NOTE - CHPI ED NUR SYMPTOMS NEG
no chills/no diaphoresis/no shortness of breath/no congestion/no vomiting/no back pain/no chest pain/no fever/no dizziness/no nausea/no syncope

## 2020-05-21 NOTE — CONSULT NOTE ADULT - SUBJECTIVE AND OBJECTIVE BOX
[facility] DIVISION OF KIDNEY DISEASES AND HYPERTENSION -- INITIAL CONSULT NOTE  --------------------------------------------------------------------------------  Luis Angel Shirley   Nephrology Fellow  Pager NS: [phone]/ [facility]: [phone]  (After 5 pm or on weekends please page the on-call fellow)    HPI: Patient is a 74y old F with pmhx of LVAD ( placed 8 years ago at [facility]) BIBEMS after cardiac arrest. Patient was found down at home by EMS, unknown duration. CPR initiated with ROSC achieved after ~1hr. Found to be in CHB in the ER with conversion to 2nd degree. Labs notable for leukocytosis. BMP with hyperkalemia to 7.2, ISABELLE- Scr 1.15, baseline Scr ~.8. Profound acidemia on ABG, pH 6.96. Given 2 amps of sodium bicarb, calcium, insulin, lokelma. Nephrology consulted for ISABELLE/hyperkalemia        PAST HISTORY  --------------------------------------------------------------------------------  PAST MEDICAL & SURGICAL HISTORY:  Diverticulitis  CHF (congestive heart failure)  Myocardial infarction  GERD (gastroesophageal reflux disease)  Polymyalgia rheumatica  Fibromyalgia  Acid reflux  LVAD (left ventricular assist device) present  No significant past surgical history    FAMILY HISTORY:  No pertinent family history in first degree relatives    PAST SOCIAL HISTORY:    ALLERGIES & MEDICATIONS  --------------------------------------------------------------------------------  Allergies    No Known Allergies    Intolerances      Standing Inpatient Medications  calcium gluconate IVPB 1 Gram(s) IV Intermittent once  calcium gluconate IVPB 1 Gram(s) IV Intermittent once  cefepime   IVPB 2000 milliGRAM(s) IV Intermittent once  chlorhexidine 0.12% Liquid 15 milliLiter(s) Oral Mucosa every 12 hours  dextrose 50% Injectable 25 Gram(s) IV Push once  DOBUTamine Infusion 2.5 MICROgram(s)/kG/Min IV Continuous <Continuous>  furosemide   Injectable 40 milliGRAM(s) IV Push once  insulin regular  human recombinant. 10 Unit(s) IV Push once  norepinephrine Infusion 0.05 MICROgram(s)/kG/Min IV Continuous <Continuous>  sodium bicarbonate  Injectable 50 milliEquivalent(s) IV Push once  sodium bicarbonate  Injectable 50 milliEquivalent(s) IV Push once  sodium zirconium cyclosilicate 10 Gram(s) Oral once  vancomycin  IVPB 1000 milliGRAM(s) IV Intermittent once    PRN Inpatient Medications      REVIEW OF SYSTEMS  --------------------------------------------------------------------------------  Unable to obtain    VITALS/PHYSICAL EXAM  --------------------------------------------------------------------------------  T(C): --  HR: 46 (05-21-20 @ 21:40) (46 - 73)  BP: 84/60 (05-21-20 @ 21:40) (67/50 - 84/60)  RR: 16 (05-21-20 @ 21:40) (16 - 16)  SpO2: 97% (05-21-20 @ 21:40) (97% - 99%)  Wt(kg): --    Weight (kg): 60 (05-21-20 @ 21:50)      Physical exam deferred due to COVID19 and preservation of PPE      LABS/STUDIES  --------------------------------------------------------------------------------              10.5   24.05 >-----------<  251      [05-21-20 @ 21:49]              35.2     135  |  109  |  9   ----------------------------<  257      [05-21-20 @ 21:49]  7.9   |  13  |  1.15        Ca     9.6     [05-21-20 @ 21:49]      Mg     2.6     [05-21-20 @ 21:49]    TPro  5.9  /  Alb  3.2  /  TBili  0.1  /  DBili  x   /  AST  179  /  ALT  73  /  AlkPhos  97  [05-21-20 @ 21:49]    PT/INR: PT 38.9 , INR 3.29       [05-21-20 @ 21:49]  PTT: 53.4       [05-21-20 @ 21:49]      Creatinine Trend:  SCr 1.15 [05-21 @ 21:49]    Urinalysis - [01-27-20 @ 17:16]      Color Yellow / Appearance Slightly Turbid / SG 1.016 / pH 6.0      Gluc Negative / Ketone Negative  / Bili Negative / Urobili Negative       Blood Trace / Protein 30 mg/dL / Leuk Est Large / Nitrite Negative      RBC 7 /  / Hyaline 1 / Gran  / Sq Epi  / Non Sq Epi 0 / Bacteria Negative [facility] DIVISION OF KIDNEY DISEASES AND HYPERTENSION -- INITIAL CONSULT NOTE  --------------------------------------------------------------------------------  Luis Angel Shirley   Nephrology Fellow  Pager NS: [phone]/ [facility]: [phone]  (After 5 pm or on weekends please page the on-call fellow)    HPI: Patient is a 74y old F with pmhx of LVAD ( placed 8 years ago at [facility]) BIBEMS after cardiac arrest. Patient was found down at home by EMS, unknown duration. CPR initiated with ROSC achieved after ~1hr per discussion with CTICU team. Found to be in CHB in the ER with conversion to 2nd degree. Labs notable for leukocytosis. BMP with hyperkalemia to 7.2, ISABELLE- Scr 1.15, baseline Scr ~.8. Profound acidemia on ABG, pH 6.96. Given 2 amps of sodium bicarb, calcium, insulin, lokelma. Nephrology consulted for ISABELLE/hyperkalemia        PAST HISTORY  --------------------------------------------------------------------------------  PAST MEDICAL & SURGICAL HISTORY:  Diverticulitis  CHF (congestive heart failure)  Myocardial infarction  GERD (gastroesophageal reflux disease)  Polymyalgia rheumatica  Fibromyalgia  Acid reflux  LVAD (left ventricular assist device) present  No significant past surgical history    FAMILY HISTORY:  No pertinent family history in first degree relatives    PAST SOCIAL HISTORY:    ALLERGIES & MEDICATIONS  --------------------------------------------------------------------------------  Allergies    No Known Allergies    Intolerances      Standing Inpatient Medications  calcium gluconate IVPB 1 Gram(s) IV Intermittent once  calcium gluconate IVPB 1 Gram(s) IV Intermittent once  cefepime   IVPB 2000 milliGRAM(s) IV Intermittent once  chlorhexidine 0.12% Liquid 15 milliLiter(s) Oral Mucosa every 12 hours  dextrose 50% Injectable 25 Gram(s) IV Push once  DOBUTamine Infusion 2.5 MICROgram(s)/kG/Min IV Continuous <Continuous>  furosemide   Injectable 40 milliGRAM(s) IV Push once  insulin regular  human recombinant. 10 Unit(s) IV Push once  norepinephrine Infusion 0.05 MICROgram(s)/kG/Min IV Continuous <Continuous>  sodium bicarbonate  Injectable 50 milliEquivalent(s) IV Push once  sodium bicarbonate  Injectable 50 milliEquivalent(s) IV Push once  sodium zirconium cyclosilicate 10 Gram(s) Oral once  vancomycin  IVPB 1000 milliGRAM(s) IV Intermittent once    PRN Inpatient Medications      REVIEW OF SYSTEMS  --------------------------------------------------------------------------------  Unable to obtain    VITALS/PHYSICAL EXAM  --------------------------------------------------------------------------------  T(C): --  HR: 46 (05-21-20 @ 21:40) (46 - 73)  BP: 84/60 (05-21-20 @ 21:40) (67/50 - 84/60)  RR: 16 (05-21-20 @ 21:40) (16 - 16)  SpO2: 97% (05-21-20 @ 21:40) (97% - 99%)  Wt(kg): --    Weight (kg): 60 (05-21-20 @ 21:50)      Physical exam deferred due to COVID19 and preservation of PPE      LABS/STUDIES  --------------------------------------------------------------------------------              10.5   24.05 >-----------<  251      [05-21-20 @ 21:49]              35.2     135  |  109  |  9   ----------------------------<  257      [05-21-20 @ 21:49]  7.9   |  13  |  1.15        Ca     9.6     [05-21-20 @ 21:49]      Mg     2.6     [05-21-20 @ 21:49]    TPro  5.9  /  Alb  3.2  /  TBili  0.1  /  DBili  x   /  AST  179  /  ALT  73  /  AlkPhos  97  [05-21-20 @ 21:49]    PT/INR: PT 38.9 , INR 3.29       [05-21-20 @ 21:49]  PTT: 53.4       [05-21-20 @ 21:49]      Creatinine Trend:  SCr 1.15 [05-21 @ 21:49]    Urinalysis - [01-27-20 @ 17:16]      Color Yellow / Appearance Slightly Turbid / SG 1.016 / pH 6.0      Gluc Negative / Ketone Negative  / Bili Negative / Urobili Negative       Blood Trace / Protein 30 mg/dL / Leuk Est Large / Nitrite Negative      RBC 7 /  / Hyaline 1 / Gran  / Sq Epi  / Non Sq Epi 0 / Bacteria Negative

## 2020-05-21 NOTE — ED PROVIDER NOTE - OBJECTIVE STATEMENT
74 Y f arrives unresponsive, per family pt has LVAD and tonight became unresponsive. EMS arrived pt in cardiac arrest had resumption of end tidal CO2 after epi and compressions. pt had combi tube on arrival changes to ET and had A line and CL placed, CTsx and cards at bedside on pt arrival for assistances in management..

## 2020-05-21 NOTE — ED PROCEDURE NOTE - CPROC ED INFUS LINE DETAIL1
Ultrasound guidance was used during placement./The location was identified, and the area was draped and prepped./The guidewire was recovered./The catheter was placed using sterile technique./All lumen(s) aspirated and flushed without difficulty.

## 2020-05-21 NOTE — CONSULT NOTE ADULT - ASSESSMENT
74y F with cardiac arrest, now with hyperkalemia/acidosis    #ISABELLE  - Pt with ISABELLE in setting of cardiac arrest, Scr 1.15mg/dl with profound acidemia to 6.96  - Received lasix 40mg IV in the ER, unclear if having UOP at this time  - Can c/w diuretics as needed for volume optimization. Would give IV bumex 2-4mg IV as needed  - Overall extremely poor prognosis given the patient's extended down time ~1hr suspect likelihood of anoxic brain injury is high. Awaiting CTH to assess. At this time we do not feel that RRT would be of benefit to change the overall prognosis.  - Can start bicarb gtt and c/w shifting measures for hyperkalemia such as insulin, dextrose, lokelma 10g TID. Would also start calcium gluconate infusion given the hyperkalemia and heart block  - Please speak to family and address GOC           Case discussed with the oncall attending

## 2020-05-21 NOTE — ED PROVIDER NOTE - PROGRESS NOTE DETAILS
attending Juve: documentation delayed due to patient acuity. Pt with hyperkalemia, initially with bradycardia that responded to medical treatment. Subsequently, pt with worsening bradycardia and blood pressure. CT surgery PAs and attending at bedside. ACLS initiated and code run by Ct surgery team. Dialysis not offered by renal. Time of death called by CT surgery team. CT surgery team to complete death certificate and speak with patient's family.

## 2020-05-21 NOTE — ED ADULT NURSE REASSESSMENT NOTE - NS ED NURSE REASSESS COMMENT FT1
Patient intubated by ED MD. 7.5 tube, 22 cm at lip. Vent settings: 400-74-3-100% Patient intubated by ED MD. 7.5 tube, 22 cm at lip. + color change. Bilateral breath sounds. Confirmed with chest x-ray. Vent settings: 466-09-9-100%.

## 2020-05-21 NOTE — ED ADULT NURSE NOTE - OBJECTIVE STATEMENT
75 y/o female history of diverticulitis, CHF, MI, GERD, and LVAD presents to the ED via EMS from home c/o cardiac arrest, post- ROSC. As per EMS, patient was sitting in her recliner and the recliner had cut through her wire on her battery pack. Patient had panicked, stood up, and had gone into cardiac arrest as per EMS. EMS states that the patient was in cardiac arrest for approximately 20 minutes. She received 3 epis, and 1 D50 and had received ROSC 73 y/o female history of diverticulitis, CHF, MI, GERD, and LVAD presents to the ED via EMS from home c/o cardiac arrest, post- ROSC. As per EMS, patient was sitting in her recliner and the recliner had cut through her wire on her battery pack. Patient had panicked, stood up, and had gone into cardiac arrest as per EMS. EMS states that the patient was in cardiac arrest for approximately 20 minutes. She received 3 epis, and 1 D50 and had received ROSC and then lost her pulse again. Patient was then given calcium 1 gm and bicarb 88 mg and then achieved ROSC. As per EMS, patient had a faint pulse, but since she had a LVAD they were measuring cardiac arrest by her CO2 which was 17 when she was arresting. 75 y/o female history of diverticulitis, CHF, MI, GERD, and LVAD presents to the ED via EMS from home c/o cardiac arrest, post- ROSC. As per EMS, patient was sitting in her recliner and the recliner had cut through her wire on her battery pack. Patient had panicked, stood up, and had gone into cardiac arrest as per EMS. EMS states that the patient was in cardiac arrest for approximately 20 minutes. She received 3 epis, and 1 D50 and had received ROSC and then lost her pulse again. Patient was then given calcium 1 gm and bicarb 88 mg and then achieved ROSC. As per EMS, patient had a faint pulse, but since she had a LVAD they were measuring cardiac arrest by her CO2 which was 17 when she was  arresting. 75 y/o female history of diverticulitis, CHF, MI, GERD, and LVAD presents to the ED via EMS from home c/o cardiac arrest, post- ROSC. As per EMS, patient was sitting in her recliner and the recliner had cut through her wire on her battery pack. Patient had panicked, stood up, and had gone into cardiac arrest as per EMS. EMS states that the patient was in cardiac arrest for approximately 20 minutes. She received 3 epis, and 1 D50 and had received ROSC and then lost her pulse again. Patient was then given calcium 1 gm and bicarb 88 mg and then achieved ROSC. As per EMS, patient had a faint pulse, but since she had a LVAD they were measuring cardiac arrest by her CO2 which was 17 when she was  arresting. Patient had a combie tube placed in the field by EMS. Patient arrived to ED with home LVAD  and batteries. Patient's battery pack that had a frayed wire was at home upon patient's arrival to ED.  Ostomy present upon arrival to the ED. Denies fever, chills, n/v, weakness, abd pain, diarrhea/constipation, numbness/tingling. Patient A&Ox0. Right tooth broken prior to arrival in ED. No secretions in airway. Patient intubated in ED (see RN reassessment note for procedure info). MAP in the 40s initally in ED with a LVAD flow of 3.0. Skin intact.

## 2020-05-21 NOTE — ED PROVIDER NOTE - PHYSICAL EXAMINATION
Gen: Unresponsive, COMBI tube in place  HEENT: Bleeding at mouth from attempted EMS intubation  CV: bradycardic, LVAD in place  Resp: CTAB, no W/R/R  GI: Abdomen soft non-distended, NTTP, no masses  MSK: No open wounds, no bruising, no LE edema  Neuro: unresponsive

## 2020-05-21 NOTE — ED PROVIDER NOTE - CARE PLAN
Principal Discharge DX:	LVAD (left ventricular assist device) present  Secondary Diagnosis:	Cardiac arrest

## 2020-05-21 NOTE — ED ADULT NURSE REASSESSMENT NOTE - NS ED NURSE REASSESS COMMENT FT1
Code ACLS initated in the ED at 23:10. Patient's MAP between 15-20 in ED. Both CT and ED team at bedside. See cardiac arrest flow sheet for details of medication given during arrest. Time of death called by CT MD at 23:20.

## 2020-05-21 NOTE — ED PROVIDER NOTE - CHIEF COMPLAINT
The patient is a 74y Female complaining of The patient is a 74y Female complaining of cardiac arrest

## 2020-05-22 NOTE — DISCHARGE NOTE FOR THE EXPIRED PATIENT - HOSPITAL COURSE
73yo F with PMH CHF, s/p LVAD (6 years ago at [facility]), on coumadin, GERD, Diverticulitis s/p ileostomy as per daughter 2 weeks ago. She presented to [facility] ED after BIBEMS s/p cardiac arrest with prolonged downtime. ROSC achieved in ambulance. Upon arrival in ED, CTS called and LVAD console alarm flashing a warning that controller needed to be changed. CTS changed controller with resolution of alarm. Patient was intubated, A line and central line placed, started on pressor support for hypotension and Dobutamine for bradycardia. Patient with hyperkalemia (K+ 7.9 on CMP) and metabolic acidosis with lactate 5.6. Patient not hemodialysis candidate as per renal 2/2 prolonged down time and grave prognosis. Patient arrested in ED again, code initiated by ED, CTS called to bedside. Accepting physician Dr. Rowe was called. Patient unable to be revived, patient pronounced at 23:20.

## 2020-05-22 NOTE — PROGRESS NOTE ADULT - SUBJECTIVE AND OBJECTIVE BOX
VANESSA RAMOS  MRN-[id number]  Patient is a 74y old  Female who presents with a chief complaint of Cardiac Arrest (21 May 2020 22:45)    HPI:  73 y/o female, with HMII LVAD , who was on recliner, the cable from the portable power supple was caught by the recliner and cut on the side,  pt became unresponsive, EMS arrived , tried different battery backups , multiple times , alarms were going off.   CPR/ACLS applied on and off for about an hour as per paramedic , pt was brought to ED, unresponsive, intubated in ED;  placed immediately on system monitor which showed alarm to change the , disccused with LVAD coordinator, controller was changed with back up controller which family brought .  Physical Exam:  Vital Signs Last 24 Hrs  T(C): 36.1 (21 May 2020 21:52), Max: 36.1 (21 May 2020 21:52)  T(F): 97 (21 May 2020 21:52), Max: 97 (21 May 2020 21:52)  HR: 125 (21 May 2020 22:20) (46 - 125)  BP: 77/61 (21 May 2020 22:20) (46/32 - 84/60)  BP(mean): 67 (21 May 2020 22:20) (35 - 70)  RR: 24 (21 May 2020 22:20) (16 - 24)  SpO2: 100% (21 May 2020 22:20) (97% - 100%)  Gen:  unresponsive , intubated in ED  CNS: unresponsive  	  Neck: no JVD, RIJ central line was inserted.   RES : clear , no wheezing                    CVS: LVAD sound   Abd: Soft, non-distended.+ ileostomy bag  Skin: No rash.  Ext:  no edema, A Line inserted in ED, L humerus intra ossious iv access .     ============================ LABS =========================                        10.5   24.05 )-----------( 251      ( 21 May 2020 21:49 )             35.2     05-21    135  |  109<H>  |  9   ----------------------------<  257<H>  7.9<HH>   |  13<L>  |  1.15    Ca    9.6      21 May 2020 21:49  Mg     2.6     05-21    TPro  5.9<L>  /  Alb  3.2<L>  /  TBili  0.1<L>  /  DBili  x   /  AST  179<H>  /  ALT  73<H>  /  AlkPhos  97  05-21    LIVER FUNCTIONS - ( 21 May 2020 21:49 )  Alb: 3.2 g/dL / Pro: 5.9 g/dL / ALK PHOS: 97 U/L / ALT: 73 U/L / AST: 179 U/L / GGT: x           PT/INR - ( 21 May 2020 21:49 )   PT: 38.9 sec;   INR: 3.29 ratio         PTT - ( 21 May 2020 21:49 )  PTT:53.4 sec  ABG - ( 21 May 2020 22:12 )  pH, Arterial: 6.96  pH, Blood: x     /  pCO2: 68    /  pO2: 173   / HCO3: 15    / Base Excess: -17.7 /  SaO2: 98        ======================================================  PAST MEDICAL & SURGICAL HISTORY:  Diverticulitis  CHF (congestive heart failure)  Myocardial infarction  GERD (gastroesophageal reflux disease)  Polymyalgia rheumatica  Fibromyalgia  Acid reflux  LVAD (left ventricular assist device) present  No significant past surgical history    ====================ASSESMENT ==============  Cardiac Arrest  s.p Heart Mate II ( LVAD)   acute renal failure  hyperkalemia  severe acidosis   s/p ileostomy for diverticulitis as per pt daughter   congestive heart failure  h/o cad/ashd/mi   GERD (gastroesophageal reflux disease)  Polymyalgia rheumatica  Fibromyalgia  Acid reflux      Plan:  discussed with LVAD coordinator pt was switched immediately to  system monitor for electrical supply and monitoring LVAD, flow 3.2- 4.2 ,   with alarm to replace the , which was replaced without difficulty ;   iv pressors norepinephrine  started due to hypotension   treated for hyperkalemia in ED  fluid challenge 250 ml NS, cvp =15  dobutamine 2.5 parker/kg/min,   CT head/chest/abd ct ordered   goodson catheter insertion ( no urine output ).   renal consult. contacted, pt with grave prognosis with prolong downtime  , not candidate for RRT.   discussed with ER . to start antibiotics vancomycin/cefepime and flagyl ;   pt is admitted . will arrange for bed in ccu while getting covid test result.     addendum:  pt became bradycaric/asystole ventricular fibrillation, cardiac arrest again.   code was started in the ED, initiated cpr and treated for hyperkalemia . no response. case was discussed with , will stop at this point. pronounced dead at 11:20 pm .  family was notified. requested autopsy.   will call [place] office.

## 2020-05-22 NOTE — ED ADULT NURSE REASSESSMENT NOTE - NS ED NURSE REASSESS COMMENT FT1
family came to view body  1 yellow colored ring along with LVAD equipment given to son, Kashif  refused clothing and bed sheets    witnessed with CHRIST Shultz family came to view body  1 yellow colored ring along with LVAD equipment (batteries, cables and chargers of LVAD) given to son, Kashif  refused clothing and bed sheets    witnessed with CHRIST Shultz

## 2020-05-22 NOTE — DISCHARGE NOTE FOR THE EXPIRED PATIENT - AUTOPSY OFFERED
EMERGENCY DEPARTMENT HISTORY AND PHYSICAL EXAM      Date: 11/23/2019  Patient Name: Kentrell Summers  Patient Age and Sex: 80 St. David's South Austin Medical Center    [facility]     Seizures ([facility] 08/2017    Dr. Scottie Saez; pt reports seizure after fall hitting head    Stroke [facility]) 10/2012    pt denies residual weakness    Subdural hematoma ([facility] 08/2017    Dr. Jara Fails, [address] [facility]  [phone].   Return to ED if worse     Diagnosis     Clinical Impression:   1. Fall, initial encounter    2. Acute cystitis without hematuria    3.  Skin tear of forearm without complication, left, initial encounter        Attestations:    Viv Reyna MD yes

## 2020-06-03 NOTE — ED PROCEDURE NOTE - CPROC ED GASTRIC INTUB DETAIL1
Gastric tube connected to low continuous suction./The orogastric tube (see size above) was inserted via the anatomic location./Placement was confirmed by aspiration of gastric secretions.

## 2020-06-03 NOTE — ED PROCEDURE NOTE - ATTENDING CONTRIBUTION TO CARE
I was present for key portions of above procedure.

## 2020-11-25 NOTE — ED ADULT NURSE NOTE - NS ED NURSE LEVEL OF CONSCIOUSNESS SPEECH
Speaking Coherently Ftsg Text: The defect edges were debeveled with a #15 scalpel blade.  Given the location of the defect, shape of the defect and the proximity to free margins a full thickness skin graft was deemed most appropriate.  Using a sterile surgical marker, the primary defect shape was transferred to the donor site. The area thus outlined was incised deep to adipose tissue with a #15 scalpel blade.  The harvested graft was then trimmed of adipose tissue until only dermis and epidermis was left.  The skin margins of the secondary defect were undermined to an appropriate distance in all directions utilizing iris scissors.  The secondary defect was closed with interrupted buried subcutaneous sutures.  The skin edges were then re-apposed with running  sutures.  The skin graft was then placed in the primary defect and oriented appropriately.

## 2022-12-29 NOTE — ED ADULT NURSE NOTE - NS ED NURSE RECORD ANOTHER HT AND WT
Subjective:      Patient ID: July Wang is a 80 y.o. female. Chief Complaint   Patient presents with    Check-Up     Hypertension, lipids - pt is fasting        Patient presents with:  Check-Up: Hypertension, lipids - pt is fasting    She is well  She never got the triamterene from her mail order  She has no c/o    YOB: 1935    Date of Visit:  12/29/2022     -- Adhesive Tape -- Itching and Dermatitis   -- Oxycontin [Oxycodone Hcl] -- Other (See Comments)    --  \"seeing things\"    Current Outpatient Medications:  GLUCOSAMINE-CHONDROITIN PO, Take by mouth, Disp: , Rfl:   triamterene-hydroCHLOROthiazide (MAXZIDE) 75-50 MG per tablet, Take 1 tablet by mouth daily, Disp: 90 tablet, Rfl: 0  rosuvastatin (CRESTOR) 10 MG tablet, TAKE 1 TABLET BY MOUTH EVERY DAY, Disp: 90 tablet, Rfl: 1  lisinopril (PRINIVIL;ZESTRIL) 40 MG tablet, TAKE 1 TABLET DAILY, Disp: 90 tablet, Rfl: 1  hydroCHLOROthiazide (MICROZIDE) 12.5 MG capsule, TAKE 1 CAPSULE BY MOUTH EVERY DAY IN THE MORNING, Disp: 90 capsule, Rfl: 1  aspirin 325 MG EC tablet, Take 1 tablet by mouth daily, Disp: 30 tablet, Rfl: 3  multivitamin (THERAGRAN) per tablet, Take 1 tablet by mouth daily  , Disp: , Rfl:   dilTIAZem (TIAZAC) 360 MG extended release capsule, TAKE 1 CAPSULE DAILY (Patient not taking: Reported on 12/29/2022), Disp: 90 capsule, Rfl: 2    No current facility-administered medications for this visit.      ---------------------------               12/29/22                      0907         ---------------------------   BP:          128/82         Site:    Left Upper Arm     Position:     Sitting        Cuff Size:  Medium Adult      Pulse:         88           Temp:   97.3 °F (36.3 °C)   TempSrc:    Temporal        Weight: 155 lb (70.3 kg)    Height:   5' (1.524 m)     ---------------------------  Body mass index is 30.27 kg/m².      Wt Readings from Last 3 Encounters:  12/29/22 : 155 lb (70.3 kg)  12/28/21 : 155 lb (70.3 kg)  10/12/21 : 155 lb (70.3 kg)    BP Readings from Last 3 Encounters:  12/29/22 : 128/82  12/28/21 : (!) 160/80  10/12/21 : 136/88              Review of Systems   Constitutional:  Negative for appetite change, chills, fever and unexpected weight change. Respiratory:  Negative for chest tightness and shortness of breath. Cardiovascular:  Negative for chest pain, palpitations and leg swelling. Gastrointestinal:  Negative for abdominal distention, abdominal pain, blood in stool, constipation, diarrhea, nausea and vomiting. Genitourinary:  Negative for difficulty urinating, dysuria and hematuria. Neurological:  Negative for headaches. Objective:   Physical Exam  Constitutional:       General: She is not in acute distress. Appearance: Normal appearance. She is well-developed. She is not ill-appearing or diaphoretic. Cardiovascular:      Rate and Rhythm: Normal rate and regular rhythm. Heart sounds: Normal heart sounds. No murmur heard. No friction rub. No gallop. Comments: No edema in lower legs   Pulmonary:      Effort: Pulmonary effort is normal. No tachypnea, accessory muscle usage or respiratory distress. Breath sounds: Normal breath sounds. No decreased breath sounds, wheezing, rhonchi or rales. Lymphadenopathy:      Cervical: No cervical adenopathy. Upper Body:      Right upper body: No supraclavicular adenopathy. Left upper body: No supraclavicular adenopathy. Skin:     General: Skin is warm and dry. Coloration: Skin is not pale. Neurological:      Mental Status: She is alert. Assessment:       Diagnosis Orders   1. Benign essential HTN  Comprehensive Metabolic Panel    Lipid Panel      2. Impaired fasting glucose  Hemoglobin A1C      3.  Hyperlipidemia, unspecified hyperlipidemia type  Comprehensive Metabolic Panel    Lipid Panel          No change  Seems to be well  Reviewed meds   She declined the flu shot Plan:      Continue the medicines  It is ok to stay off the triamterene medication  Consider the shingle vaccine  See us in about 6 months        Jeannette Yeager MD Yes

## 2023-04-19 NOTE — PATIENT PROFILE ADULT. - CENTRAL VENOUS CATHETER
----- Message from Neri Castro sent at 4/19/2023  8:52 AM CDT -----  Type:  RX Refill Request    Who Called: [facility]     Refill or New Rx:Refill     RX Name and Strength:atorvastatin (LIPITOR) 10 MG tablet    How is the patient currently taking it? Sig: TAKE ONE TABLET BY MOUTH EVERY DAY  Sent to pharmacy as: atorvastatin (LIPITOR) 10 MG tablet        Is this a 30 day or 90 day RX:    Preferred Pharmacy with phone number:[facility] - [address]           Local or Mail Order:Local     Ordering Provider:Shruthi Lacy MD    Would the patient rather a call back or a response via [facility]? Call back     Best Call Back Number:[phone] Pharmacy     Additional Information:        
no

## 2024-01-04 NOTE — ED PROVIDER NOTE - EYES, MLM
"Jc Lei      1955    A DME order for supplies has been placed to [facility], [address]. If there are any issues with your order including not receiving the order please call [facility] at [phone] option 1. They can also provide a tracking number for you if you had supplies shipped to you.    Dressing changes outside of clinic are being performed by  Girlfriend    PLAN:  We applied Silver Nitrate to the hypergranulation tissue today.  This may lead to temporary staining of the skin with increased drainage and discolored gray/black drainage.  This may be present for a few days and is a normal process.  OK to shower, but leave mepilex dressing in place and keep from getting wounds wet.  DO NOT soak the leg in water (Pools, Whirlpools, Hot Tubs, Baths, etc.)    Wound Dressing Change: Right Lateral Lower Leg  - Wash your hands with soap and water before you begin your dressing change and prepare a clean surface for dressings.  - Cleanse with mild unscented soap (such as Cetaphil, Cerave or Dove) and water  - Apply small amount of VASHE on gauze, lay into wound bed, let sit for 10 minutes, remove gauze (do not rinse)  - OK to use microcleanse wound cleaning spray instead of Vashe  - Apply 1/10 of a 4.25 x 4.25\" Polymen Silver - cut to fit the wound bed  - Cover with one 4x4 Mepilex silicone bordered dressing  Pull up Edema Wear Navy Stripe (or Spandage 7) from base of toes to back of knee  Change 3 times/week      Wound Dressing Change: Right Anterior Lower Leg  - Wash your hands with soap and water before you begin your dressing change and prepare a clean surface for dressings.  - Cleanse with mild unscented soap (such as Cetaphil, Cerave or Dove) and water  - Apply small amount of VASHE on gauze, lay into wound bed, let sit for 10 minutes, remove gauze (do not rinse)  - OK to use microcleanse wound cleaning spray instead of Vashe  - Apply 1/10 of a 4.25 x 4.25\" Polymen Silver - " cut to fit the wound bed  - Cover with one 4x4 Mepilex silicone bordered dressing  Pull up Edema Wear Navy Stripe (or Spandage 7) from base of toes to back of knee  Change 3 times/week    Then apply Navy Stripe EdemaWear from toes to knee. EdemaWear should be worn 24/7 unless bathing/showering or changing the dressing. You will wash and reuse the EdemaWear. DO NOT CUT THE EDEMAWEAR. IF IT IS TOO LONG THEN CUFF THE EDEMAWEAR (the EdemaWear can shrink length wise with washing).    Please raise your legs above your hips for 30 mins 2 times a day to promote wound healing.  Walk as much as you can. When you sit raise your ankle above your hips to promote wound healing.     Walk as much as you can, as you are able. Whenever you sit raise your ankle above your hips to promote wound healing.        Ye Spence M.D. January 4, 2024    Call us at [phone] if you have any questions about your wounds, have redness or swelling around your wound, have a fever of 101 degrees Fahrenheit or greater or if you have any other problems or concerns. We answer the phone Monday through Friday 8 am to 4 pm, please leave a message as we check the voicemail frequently throughout the day.     If you had a positive experience please indicate that on your patient satisfaction survey form that [facility] will be sending you.    It was a pleasure meeting with you today.  Thank you for allowing me and my team the privilege of caring for you today.  YOU are the reason we are here, and I truly hope we provided you with the excellent service you deserve.  Please let us know if there is anything else we can do for you so that we can be sure you are leaving completely satisfied with your care experience.      If you have any billing related questions please call the [facility] Business office at [phone]. The clinic staff does not handle billing related matters.    If you are scheduled to have a follow up appointment, you will  receive a reminder call the day before your visit. On the appointment day please arrive 15 minutes prior to your appointment time. If you are unable to keep that appointment, please call the clinic to cancel or reschedule. If you are more than 10 minutes late or greater for your scheduled appointment time, the clinic policy is that you may be asked to reschedule.     Clear bilaterally, pupils equal, round and reactive to light.

## 2024-08-21 NOTE — DISCHARGE NOTE ADULT - NSFTFCONTACT3DT_GEN_ALL_CORE
[FreeTextEntry3] : I, Casimiro Heard solely acted as scribe for Dr. Maria Eugenia Sahu on 08/21/2024  All medical entries made by the Scribe were at my, Dr. Arias, direction and personally dictated by me on 08/21/2024 . I have reviewed the chart and agree that the record accurately reflects my personal performance of the history, physical exam, assessment and plan. I have also personally directed, reviewed, and agreed with the chart.
14-Jun-2017
